# Patient Record
Sex: FEMALE | Race: WHITE | Employment: OTHER | URBAN - METROPOLITAN AREA
[De-identification: names, ages, dates, MRNs, and addresses within clinical notes are randomized per-mention and may not be internally consistent; named-entity substitution may affect disease eponyms.]

---

## 2018-10-01 RX ORDER — HYDROCHLOROTHIAZIDE 25 MG/1
TABLET ORAL
Qty: 90 TAB | Refills: 1 | Status: SHIPPED | OUTPATIENT
Start: 2018-10-01 | End: 2019-04-22 | Stop reason: SDUPTHER

## 2018-12-12 ENCOUNTER — OFFICE VISIT (OUTPATIENT)
Dept: FAMILY MEDICINE CLINIC | Age: 83
End: 2018-12-12

## 2018-12-12 VITALS
OXYGEN SATURATION: 99 % | WEIGHT: 128 LBS | SYSTOLIC BLOOD PRESSURE: 160 MMHG | DIASTOLIC BLOOD PRESSURE: 88 MMHG | RESPIRATION RATE: 16 BRPM | TEMPERATURE: 99.2 F | HEART RATE: 83 BPM | BODY MASS INDEX: 24.17 KG/M2 | HEIGHT: 61 IN

## 2018-12-12 DIAGNOSIS — M79.642 PAIN OF LEFT HAND: Primary | ICD-10-CM

## 2018-12-12 RX ORDER — IBUPROFEN 200 MG
TABLET ORAL
COMMUNITY
End: 2019-03-19

## 2018-12-12 RX ORDER — DICLOFENAC SODIUM 10 MG/G
GEL TOPICAL 4 TIMES DAILY
Qty: 100 G | Refills: 1 | Status: SHIPPED | OUTPATIENT
Start: 2018-12-12 | End: 2019-01-24

## 2018-12-12 NOTE — PROGRESS NOTES
1. Have you been to the ER, urgent care clinic since your last visit? Hospitalized since your last visit? No    2. Have you seen or consulted any other health care providers outside of the [address] [place] [address] since your last visit? Include any pap smears or colon screening.  No

## 2018-12-12 NOTE — PATIENT INSTRUCTIONS
Get [facility] joint splint (google images)  Get xray of hand  Use cream on left hand

## 2018-12-12 NOTE — PROGRESS NOTES
Nicholas Bledsoe is a 82 y.o. female presenting for Hand Pain (left hand x1 month)      History of Present Illness:  Left hand pain   One month  Left handed  Points to web space between thumb and index finger and palm  No injury. Hard to write  Wakes her at night hurting  Painful to close hand around anything  Taking Motrin with short term relief    Review of Systems   Constitutional: Negative for chills and fever. Musculoskeletal: Positive for joint pain. Negative for falls and myalgias. Past Medical History:   Diagnosis Date    Cystic fibrosis ([facility] [address]     GERD (gastroesophageal reflux disease)      Past Surgical History:   Procedure Laterality Date    HX DILATION AND CURETTAGE      HX HYSTERECTOMY      HX TONSIL AND ADENOIDECTOMY       Family History   Problem Relation Age of Onset    Coronary Artery Disease Mother     Breast Cancer Sister      Social History     Socioeconomic History    Marital status:      Spouse name: Not on file    Number of children: Not on file    Years of education: Not on file    Highest education level: Not on file   Social Needs    Financial resource strain: Not on file    Food insecurity - worry: Not on file    Food insecurity - inability: Not on file   Muut needs - medical: Not on file   Muut needs - non-medical: Not on file   Occupational History    Not on file   Tobacco Use    Smoking status: Never Smoker    Smokeless tobacco: Never Used   Substance and Sexual Activity    Alcohol use: No     Frequency: Never    Drug use: No    Sexual activity: Not on file   Other Topics Concern    Not on file   Social History Narrative    Not on file         Current Outpatient Medications   Medication Sig Dispense Refill    dornase alpha (PULMOZYME) 1 mg/mL nebulizer solution Take 2.5 mg by inhalation daily.  vit A/C/E ac/ZnOx/cupric oxide (EYE VITAMIN AND MINERALS PO) Take  by mouth.       ibuprofen (MOTRIN) 200 mg tablet Take  by mouth.      diclofenac (VOLTAREN) 1 % gel Apply  to affected area four (4) times daily. 100 g 1    hydroCHLOROthiazide (HYDRODIURIL) 25 mg tablet TAKE 1 TABLET BY MOUTH EVERY DAY 90 Tab 1         Allergies   Allergen Reactions    Levothyroxine Other (comments)     Hypertension, rash    Shellfish Derived Rash    Tetanus Toxoid, Adsorbed Other (comments)       Vitals:    12/12/18 1516   BP: 160/88   Pulse: 83   Resp: 16   Temp: 99.2 °F (37.3 °C)   TempSrc: Oral   SpO2: 99%   Weight: 128 lb (58.1 kg)   Height: 5' 1\" (1.549 m)     Body mass index is 24.19 kg/m². Objective  General: Patient alert and oriented and in NAD  HEENT: PER/EOMI, no conjunctival pallor or scleral icterus. No thyromegaly or cervical lymphadenopathy  Left hand, FROM all joints, some enlargement of left CMC, mildly tender, Mild pain in wrist with flexion, Normal neurovasc exam  Neuro: AAOx3  Psych: Appropriate mood and affect      Assessment and Plan:    1. Pain of left hand  Wrist and thumb splint, diclofenac cream  - XR HAND LT MIN 3 V; Future    FU 3-4 weeks. Diagnosis and plan discussed with patient who verbillized understanding. Follow-up Disposition:  Return in about 1 month (around 1/12/2019).     [facility]

## 2018-12-14 ENCOUNTER — HOSPITAL ENCOUNTER (OUTPATIENT)
Dept: GENERAL RADIOLOGY | Age: 83
Discharge: HOME | End: 2018-12-14
Attending: FAMILY MEDICINE
Payer: MEDICARE

## 2018-12-14 DIAGNOSIS — M79.642 PAIN OF LEFT HAND: ICD-10-CM

## 2018-12-14 PROCEDURE — 73130 X-RAY EXAM OF HAND: CPT

## 2018-12-17 NOTE — PROGRESS NOTES
Spoke with patient and informed patient of her X-ray results per Dr. Waqar Haley confirms that she has arthritis of the thumb joint as we thought.  She should use splint and arthritis cream and see me if this isn't working for her. Patient verbalized an understanding.

## 2018-12-17 NOTE — PROGRESS NOTES
Call patient. Her xray confirms that she has arthritis of the thumb joint as we thought. She should use splint and arthritis cream and see me if this isn't working for her.   RH

## 2018-12-31 ENCOUNTER — TELEPHONE (OUTPATIENT)
Dept: FAMILY MEDICINE CLINIC | Age: 83
End: 2018-12-31

## 2019-01-02 ENCOUNTER — OFFICE VISIT (OUTPATIENT)
Dept: FAMILY MEDICINE CLINIC | Age: 84
End: 2019-01-02

## 2019-01-02 VITALS
SYSTOLIC BLOOD PRESSURE: 166 MMHG | HEART RATE: 91 BPM | HEIGHT: 61 IN | DIASTOLIC BLOOD PRESSURE: 77 MMHG | OXYGEN SATURATION: 97 % | TEMPERATURE: 99.7 F | BODY MASS INDEX: 24.17 KG/M2 | WEIGHT: 128 LBS | RESPIRATION RATE: 18 BRPM

## 2019-01-02 DIAGNOSIS — M19.049 HAND ARTHRITIS: ICD-10-CM

## 2019-01-02 DIAGNOSIS — J40 BRONCHITIS: Primary | ICD-10-CM

## 2019-01-02 RX ORDER — CIPROFLOXACIN 750 MG/1
750 TABLET, FILM COATED ORAL 2 TIMES DAILY
Qty: 28 TAB | Refills: 0 | Status: SHIPPED | OUTPATIENT
Start: 2019-01-02 | End: 2019-01-04 | Stop reason: SDUPTHER

## 2019-01-02 NOTE — PROGRESS NOTES
Hira Her is a 82 y.o. female presenting for Arthritis and Cough      History of Present Illness:  Hand pain  Cream didn't help. Wearing splint. Hurts a lot at night. Hurts up into forearm  No weakness or numbness  Hurts to reach back for seat belt or  frying nciole  Again denies injury. Hurt after using vacuum   Got splint helps during day and helps her pick things    Developed Cough  Sick past 3 weeks,   Congestion, cough, yellow thick sputum  Still on pulmozyme for CF. Some low grade fever. Took minocycline which she got over phone from [facility]. 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 No better with minocycline and only took a single dose. .  Had a reaction and aching all over after the first dose. Review of Systems   Constitutional: Positive for malaise/fatigue. HENT: Positive for congestion. Negative for sinus pain and sore throat. Respiratory: Positive for cough and sputum production. Negative for hemoptysis, shortness of breath and wheezing. Cardiovascular: Negative for chest pain. Musculoskeletal: Positive for joint pain and neck pain. Neurological: Positive for headaches.        Past Medical History:   Diagnosis Date    Chronic insomnia     Cystic fibrosis (HCC)     GERD (gastroesophageal reflux disease)     HTN (hypertension)     Osteoporosis      Past Surgical History:   Procedure Laterality Date    HX DILATION AND CURETTAGE      HX HYSTERECTOMY      HX TONSIL AND ADENOIDECTOMY       Family History   Problem Relation Age of Onset    Coronary Artery Disease Mother     Breast Cancer Sister      Social History     Socioeconomic History    Marital status:      Spouse name: Not on file    Number of children: Not on file    Years of education: Not on file    Highest education level: Not on file   Social Needs    Financial resource strain: Not on file    Food insecurity - worry: Not on file    Food insecurity - inability: Not on file    Transportation needs - medical: Not on file   [employer] needs - non-medical: Not on file   Occupational History    Not on file   Tobacco Use    Smoking status: Never Smoker    Smokeless tobacco: Never Used   Substance and Sexual Activity    Alcohol use: No     Frequency: Never    Drug use: No    Sexual activity: Not on file   Other Topics Concern    Not on file   Social History Narrative    Not on file         Current Outpatient Medications   Medication Sig Dispense Refill    acetaminophen (TYLENOL PO) Take  by mouth.  diphenhydramine HCl (SLEEP AID, DIPHENHYDRAMINE, PO) Take  by mouth.  ciprofloxacin HCl (CIPRO) 750 mg tablet Take 1 Tab by mouth two (2) times a day for 14 days. 28 Tab 0    dornase alpha (PULMOZYME) 1 mg/mL nebulizer solution Take 2.5 mg by inhalation daily.  vit A/C/E ac/ZnOx/cupric oxide (EYE VITAMIN AND MINERALS PO) Take  by mouth.  ibuprofen (MOTRIN) 200 mg tablet Take  by mouth.  diclofenac (VOLTAREN) 1 % gel Apply  to affected area four (4) times daily. 100 g 1    hydroCHLOROthiazide (HYDRODIURIL) 25 mg tablet TAKE 1 TABLET BY MOUTH EVERY DAY 90 Tab 1         Allergies   Allergen Reactions    Levothyroxine Other (comments)     Hypertension, rash    Shellfish Derived Rash    Tetanus Toxoid, Adsorbed Other (comments)       Vitals:    01/02/19 1619   BP: 166/77   Pulse: 91   Resp: 18   Temp: 99.7 °F (37.6 °C)   TempSrc: Oral   SpO2: 97%   Weight: 128 lb (58.1 kg)   Height: 5' 1\" (1.549 m)     Body mass index is 24.19 kg/m². Objective  General: Patient alert and oriented and in NAD  HEENT: PER/EOMI, no conjunctival pallor or scleral icterus. No thyromegaly or cervical lymphadenopathy  Heart: Regular rate and rhythm, No murmurs, rubs or gallops.    Lungs: Clear to auscultation bilaterally, no wheezing, rales but rare ronchi left mid lung field  Ext: No edema, FROM neck without weakness anywhere in upper extremities  Left hand remains tender in [address] thumb area and has pain on wrist flexion in same area. Previous xrays ok except DJD CMC thumb and osteopenia. Skin: No rashes or lesions noted on exposed skin  Neuro: AAOx3  Psych: Appropriate mood and affect      Assessment and Plan:    1. Bronchitis  3 weeks of lung sx in CF patient. Unable to tolerate minocycline  Likely needs coverage of pseudomonas. Have given rx for CIPRO but patient not to fill until she hears from me. Will call [facility] in [place] and see if they agree or have other recommendations. - ciprofloxacin HCl (CIPRO) 750 mg tablet; Take 1 Tab by mouth two (2) times a day for 14 days. Dispense: 28 Tab; Refill: 0    2. 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 Aches all over with lung infection and inflammation but no evidence of inflammatory arthritis that I see today.  - REFERRAL TO ORTHOPEDICS          Diagnosis and plan discussed with patient who verbillized understanding.     Follow-up Disposition: Not on File    [facility]

## 2019-01-02 NOTE — PROGRESS NOTES
1. Have you been to the ER, urgent care clinic since your last visit? Hospitalized since your last visit? No    2. Have you seen or consulted any other health care providers outside of the [address] [place] [address] since your last visit? Include any pap smears or colon screening.  No

## 2019-01-03 ENCOUNTER — TELEPHONE (OUTPATIENT)
Dept: FAMILY MEDICINE CLINIC | Age: 84
End: 2019-01-03

## 2019-01-03 NOTE — TELEPHONE ENCOUNTER
Called and discussed care with Dr. Nikos Vaughn [phone]. He agrees with CIPRO. Patient didn't tolerate high dose Bactrim in past.    He would like her to FU with him in 2 weeks to repeat cultures, CXR and PFT's    Left message RE the above for patient.

## 2019-01-04 ENCOUNTER — TELEPHONE (OUTPATIENT)
Dept: FAMILY MEDICINE CLINIC | Age: 84
End: 2019-01-04

## 2019-01-04 DIAGNOSIS — J40 BRONCHITIS: ICD-10-CM

## 2019-01-04 RX ORDER — CIPROFLOXACIN 750 MG/1
750 TABLET, FILM COATED ORAL 2 TIMES DAILY
Qty: 28 TAB | Refills: 0 | Status: SHIPPED | OUTPATIENT
Start: 2019-01-04 | End: 2019-01-18

## 2019-01-04 NOTE — TELEPHONE ENCOUNTER
Patient called and left a voicemail stating that she verbalizes an understanding of the following message Dr. Lizette Will left on yesterday:   \"Called and discussed care with Dr. Heladio Laura [phone].  [address] agrees with CIPRO. Patient didn't tolerate high dose Bactrim in past.  [address] would like her to FU with him in 2 weeks to repeat cultures, CXR and PFT's. \"    Patient states that she will follow up with Dr. Rosy Pérez.

## 2019-01-04 NOTE — TELEPHONE ENCOUNTER
Patient didn't get med. We sent incorrectly to mail order service. Resent to local [facility].  Didn't get message either (on cell).   Reviewed instructions to FU at [facility] in 2 weeks with CF clinic for CXR, etc.

## 2019-01-17 ENCOUNTER — TELEPHONE (OUTPATIENT)
Dept: FAMILY MEDICINE CLINIC | Age: 84
End: 2019-01-17

## 2019-01-17 NOTE — TELEPHONE ENCOUNTER
Received a voicemail from patient stating that she gave Dr. Magen Loaiza office at [facility] a call and they stated that our office needed to call for patient to set up an appointment and even then it will be 6-8 weeks before patient can be seen. In voicemail patient stated if she can see anyone else and per patient she was told no. Patient states that she can not stand the pain that she is having and it is keeping her up at night. Please advise.

## 2019-01-18 NOTE — TELEPHONE ENCOUNTER
See previous two clinic notes:    Patient with severe arthralgias after single dose of minocycline for persistent bronchitis symptoms. Patient has CF and chronic bronchiectasis followed at [facility]. Initially sx concentrated in wrist and hand. Seen by hand surgery and temporarily better after steroid shot, that benefit dissipated after 3-4 days. Have arranged to be seen for RHEUM for possible reactive arthritis.   RH

## 2019-01-24 ENCOUNTER — HOSPITAL ENCOUNTER (OUTPATIENT)
Dept: LAB | Age: 84
Discharge: HOME | End: 2019-01-24
Payer: MEDICARE

## 2019-01-24 ENCOUNTER — OFFICE VISIT (OUTPATIENT)
Dept: RHEUMATOLOGY | Age: 84
End: 2019-01-24

## 2019-01-24 VITALS
TEMPERATURE: 99.1 F | RESPIRATION RATE: 20 BRPM | HEIGHT: 61 IN | WEIGHT: 127.6 LBS | OXYGEN SATURATION: 93 % | HEART RATE: 97 BPM | DIASTOLIC BLOOD PRESSURE: 77 MMHG | SYSTOLIC BLOOD PRESSURE: 154 MMHG | BODY MASS INDEX: 24.09 KG/M2

## 2019-01-24 DIAGNOSIS — M19.049 CMC ARTHRITIS: ICD-10-CM

## 2019-01-24 DIAGNOSIS — Z14.1 CYSTIC FIBROSIS CARRIER: ICD-10-CM

## 2019-01-24 DIAGNOSIS — C53.9 MALIGNANT NEOPLASM OF CERVIX, UNSPECIFIED SITE (HCC): ICD-10-CM

## 2019-01-24 DIAGNOSIS — M35.3 POLYMYALGIA RHEUMATICA (HCC): Primary | ICD-10-CM

## 2019-01-24 PROCEDURE — 85025 COMPLETE CBC W/AUTO DIFF WBC: CPT

## 2019-01-24 PROCEDURE — 86140 C-REACTIVE PROTEIN: CPT

## 2019-01-24 PROCEDURE — 85651 RBC SED RATE NONAUTOMATED: CPT

## 2019-01-24 PROCEDURE — 86431 RHEUMATOID FACTOR QUANT: CPT

## 2019-01-24 PROCEDURE — 86200 CCP ANTIBODY: CPT

## 2019-01-24 PROCEDURE — 80053 COMPREHEN METABOLIC PANEL: CPT

## 2019-01-24 PROCEDURE — 36415 COLL VENOUS BLD VENIPUNCTURE: CPT

## 2019-01-24 RX ORDER — PREDNISONE 5 MG/1
TABLET ORAL
Qty: 90 TAB | Refills: 1 | Status: SHIPPED | OUTPATIENT
Start: 2019-01-24 | End: 2019-04-03 | Stop reason: SDUPTHER

## 2019-01-24 RX ORDER — LANOLIN ALCOHOL/MO/W.PET/CERES
1000 CREAM (GRAM) TOPICAL DAILY
COMMUNITY
End: 2019-02-19

## 2019-01-24 RX ORDER — PREDNISONE 10 MG/1
TABLET ORAL
Qty: 60 TAB | Refills: 1 | Status: SHIPPED | OUTPATIENT
Start: 2019-01-24 | End: 2019-02-19

## 2019-01-24 NOTE — PATIENT INSTRUCTIONS
Prednisone Instructions: Take 20 mg oral daily for 2 eweks, then Take 17.5 mg oral daily for 2 weeks, then take 15 mg oral daily for 2 weeks, then take 12.5 mg oral daily for 2 weeks, then take 10 mg oral daily for 2 weeks, then 7.5 mg daily for 2 weeks,  Then 5 mg daily for 2 weeks, then 2.5 mg daily for 2 weeks, then stop

## 2019-01-24 NOTE — PROGRESS NOTES
REASON FOR VISIT This is the initial evaluation for Ms. Karla Metcalf a 82 y.o.  female for question of arthritis. The patient is referred to the [facility] at the request of Dr. Frank Vargas. HISTORY OF PRESENT ILLNESS This is a 82 y.o. female with hx of cervical cancer s/p hysterectomy, neuropathy in feet. Today, the patient complains of pain all over. MHAQ: 1.5 Pain scale: 8.5/10 Patient is s/p left CMC joint injection for OA. The patient notes that she has had pains occasionally which go away with motrin. Around thanksgiving she was doing a lot of cleaning and her left hand started hurting her. The pain kept getting worse. She went to her PCP and she was given a topical cream to put on it. The gel made her get hives. At the same time,  Her  had the flu and she got the same symptoms. She has CF and normally she has clear sputum but during this period her sputum was yellow-green. She called her CF doctor and she was put in minocycline. She took it on 12/24/2018. Her hand continued hurting. After taking the antibiotic, she started getting pain everywhere including arms, neck, back, legs. She stopped the antibiotic thinking maybe it was the antibiotic but the pain did not go away after a few days. She is very active and now she just moves and she has the pain. At night time, she couldn't sleep due to the pain. She was taking motrin. At night even moving a little bit made her have bad pain. Even now she is the same and has to take motrin multiple times at night. It is an achy pain. When she wakes up now, she has trouble even sitting up. She feels worst the first hour when she is up. She moves around better after an hour but the symptoms are still there. She is not on prednisone for this. She also has stiffness which is there during the day.   At night time, it is pain that bothers her the most.   
 She feels her legs and feet are swollen and has lymphedema. Her cancer is now in remission. She gets monitoring frequently every 6 months. No weakness. Just loss of energy and pain. REVIEW OF SYSTEMS A 15 point review of systems was performed and summarized below. The questionnaire was reviewed with the patient and scanned into the patient's medical record. General:  denies recent weight gain, recent weight loss, fatigue, weakness, fever, night sweats Musculoskeletal: endorses morning stiffness (lasting 120 minutes) joint pain, denies  joint swelling, , muscle pain Ears: denies ringing in ears, loss of hearing, deafness Eyes: denies pain, redness, loss of vision, double vision, blurred vision, dryness, foreign body sensation Mouth:  denies sore tongue, oral ulcers, bleeding gums, loss of taste, dryness, increased dental caries Nose:  denies nosebleeds, loss of smell, nasal ulcers Throat:  denies frequent sore throats, hoarseness, difficulty in swallowing, pain in jaw while chewing Neck:  denies swollen glands, tender glands Cardiopulmonary: endorses productive coughdenies pain in chest, irregular heart beat, sudden changes in heart beat, shortness of breath, difficulty breathing at night, dry cough, , coughing of blood, wheezing Gastrointestinal:  denies nausea, heartburn, stomach pain relieved by food, vomiting of blood/\"coffee grounds\", jaundice, increasing constipation, persistent diarrhea, blood in stools, black stools Genitourinary:  denies nocturia, difficult urination, pain or burning on urination, blood in urine, cloudy urine, pus in urine, genital discharge, frequent urination, vaginal dryness, rash/ulcers, sexual difficulties Hematologic: denies anemia, bleeding tendency, blood clots Skin:  denies easy bruising, sun sensitive, rash, redness, hives, skin tightness, nodules/bumps, hair loss, color changes of hands or feet in the cold (Raynaud's), nailbed changes, mechanics hands Neurologic: endorses headachesdenies , dizziness, muscle weakness, numbness or tingling in hands/feet, memory loss Psychiatric: denies depression, excessive worries, PTSD, Bipolar Sleep: endorses poor sleep (5-6 hours), difficulty falling asleep, difficulty staying asleep  
denies  denies snoring, apnea, daytime somnolence, PAST MEDICAL HISTORY Past Medical History:  
Diagnosis Date  Chronic insomnia  Cystic fibrosis ([facility] [address] Dr. Maurilio Harris, [facility] CF clinic, Brochiectasis, pancreatic cysts in past  
 GERD (gastroesophageal reflux disease)  HTN (hypertension)  Osteoporosis Past Surgical History:  
Procedure Laterality Date  HX DILATION AND CURETTAGE    
 HX HYSTERECTOMY  HX TONSIL AND ADENOIDECTOMY FAMILY HISTORY Family History Problem Relation Age of Onset  Coronary Artery Disease Mother  Breast Cancer Sister  Cystic Fibrosis Son SOCIAL HISTORY Social History Tobacco Use  Smoking status: Never Smoker  Smokeless tobacco: Never Used Substance Use Topics  Alcohol use: No  
  Frequency: Never  Drug use: No  
 
 
MEDICATIONS Current Outpatient Medications Medication Sig Dispense Refill  cyanocobalamin 1,000 mcg tablet Take 1,000 mcg by mouth daily.  predniSONE (DELTASONE) 10 mg tablet Take 20 mg for 2 weeks, then 17.5 mg for 2 weeks, then 15 mg for 2 weeks, then 12.5 mg for 2 weeks 60 Tab 1  predniSONE (DELTASONE) 5 mg tablet Take 20 mg for 2 weeks, then 17.5 mg for 2 weeks, then 15 mg for 2 weeks, then 12.5 mg for 2 weeks 90 Tab 1  
 acetaminophen (TYLENOL PO) Take  by mouth.  diphenhydramine HCl (SLEEP AID, DIPHENHYDRAMINE, PO) Take  by mouth.  dornase alpha (PULMOZYME) 1 mg/mL nebulizer solution Take 2.5 mg by inhalation daily.  vit A/C/E ac/ZnOx/cupric oxide (EYE VITAMIN AND MINERALS PO) Take  by mouth.  ibuprofen (MOTRIN) 200 mg tablet Take  by mouth.  hydroCHLOROthiazide (HYDRODIURIL) 25 mg tablet TAKE 1 TABLET BY MOUTH EVERY DAY 90 Tab 1 ALLERGIES Allergies Allergen Reactions  Levothyroxine Other (comments) Hypertension, rash  Shellfish Derived Rash  Tetanus Toxoid, Adsorbed Other (comments) PHYSICAL EXAMINATION Visit Vitals /77 (BP 1 Location: Right arm, BP Patient Position: Sitting) Pulse 97 Temp 99.1 °F (37.3 °C) (Oral) Resp 20 Ht 5' 1\" (1.549 m) Wt 127 lb 9.6 oz (57.9 kg) SpO2 93% BMI 24.11 kg/m² Body mass index is 24.11 kg/m². General: NAD HEENT: PERRL, anicteric, non-injected sclerae; oropharynx without ulcers, erythema, or exudate. Moist mucous membranes. Lymphatic: No cervical or axillary lymphadenopathy. Cardiovascular: S1, S2,no R/M/G Pulmonary: CTA b/l. No wheezes/rales/rhonchi. Abdominal: Soft,NTND, + BS. Skin: No rash, nodules, or periungual changes. + rosacea Neuro: Alert; able to carry normal conversation Musculoskeletal:  
TTP of lower lumbar spine mild TTP of the upper neck muscles, AC, GH and subacromial bursa b/l FROM of b/l shoulders but with pain. Empty can positive, Impingement positive b/l. Subscapularis negative,  Infraspinatus negative B/l CMC squaring and TTP Right knee with TTP and a small cool effusion TTP of b/l trochanteric bursa DATA REVIEW Prior medical records were reviewed and if applicable are summarized as below: 
 
Labs: N/A Imaging:  
Left hand xray (12/18): [facility] arthritis ASSESSMENT AND PLAN 
 
A 82 y.o. female with history of cervical cancer status post hysterectomy, cystic fibrosis carrier, presents today for evaluation of arthralgias specifically in shoulders, neck as well as hips and legs. The patient's symptoms are concerning for polymyalgia rheumatica to me. Differential diagnosis also includes rheumatoid arthritis, CPPD arthropathy.   It is less likely rotator cuff tendinitis given that she has pain in her legs and hips as well. #Arthralgias: As noted above I am concerned about PMR. -Bilateral shoulder x-rays to evaluate for CPPD. 
-RF, CCP, ESR, CRP 
-Start patient on prednisone 20 mg oral daily for 2 weeks. Taper by 2.5 mg every 2 weeks as tolerated. Advised patient to inform me next time about the response to prednisone. -  I counseled the patient on the risk of avascular necrosis from corticosteroids. For each 20 mg of prednisone taken for over a month, the risk of AVN is 5%. It can also lead to weight gain, mood imbalances, osteoporosis, acne etc.  
-Up-to-date with malignancy screening. 
-No symptoms for GCA. #Cervical cancer: Appears to be in remission #Cystic fibrosis: Patient will speak with her doctor before initiating prednisone. #Medication toxicity monitoring: 
-CBC, CMP The patient voiced understanding of the aforementioned assessment and plan. Summary of plan was provided in the After Visit Summary patient instructions. I also provided education about MyChart setup and utility. Ms. Agueda Gamble has a reminder for a \"due or due soon\" health maintenance. I have asked that she contact her primary care provider for follow-up on this health maintenance. TODAY'S ORDERS Orders Placed This Encounter  XR SHOULDER RT AP/LAT MIN 2 V  
 XR SHOULDER LT AP/LAT MIN 2 V  
 METABOLIC PANEL, COMPREHENSIVE  
 CBC WITH AUTOMATED DIFF  
 SED RATE (ESR)  C REACTIVE PROTEIN, QT  
 CYCLIC CITRUL PEPTIDE AB, IGG  
 RHEUMATOID FACTOR, QL  
 cyanocobalamin 1,000 mcg tablet  predniSONE (DELTASONE) 10 mg tablet  predniSONE (DELTASONE) 5 mg tablet Future Appointments Date Time Provider Toma Newman 2/21/2019  8:30 AM Lisa Whipple MD [facility] Camilla Wiley MD 
 
Adult Rheumatology [facility], [address] [place] [address] Phone [phone] Fax [phone]

## 2019-01-25 LAB
ALBUMIN SERPL-MCNC: 4.2 G/DL (ref 3.5–4.7)
ALBUMIN/GLOB SERPL: 1.4 {RATIO} (ref 1.2–2.2)
ALP SERPL-CCNC: 80 IU/L (ref 39–117)
ALT SERPL-CCNC: 10 IU/L (ref 0–32)
AST SERPL-CCNC: 13 IU/L (ref 0–40)
BASOPHILS # BLD AUTO: 0 X10E3/UL (ref 0–0.2)
BASOPHILS NFR BLD AUTO: 0 %
BILIRUB SERPL-MCNC: 0.5 MG/DL (ref 0–1.2)
BUN SERPL-MCNC: 17 MG/DL (ref 8–27)
BUN/CREAT SERPL: 29 (ref 12–28)
CALCIUM SERPL-MCNC: 9.8 MG/DL (ref 8.7–10.3)
CCP IGA+IGG SERPL IA-ACNC: 6 UNITS (ref 0–19)
CHLORIDE SERPL-SCNC: 102 MMOL/L (ref 96–106)
CO2 SERPL-SCNC: 26 MMOL/L (ref 20–29)
CREAT SERPL-MCNC: 0.58 MG/DL (ref 0.57–1)
CRP SERPL-MCNC: 30.8 MG/L (ref 0–4.9)
EOSINOPHIL # BLD AUTO: 0.2 X10E3/UL (ref 0–0.4)
EOSINOPHIL NFR BLD AUTO: 2 %
ERYTHROCYTE [DISTWIDTH] IN BLOOD BY AUTOMATED COUNT: 13.1 % (ref 12.3–15.4)
ERYTHROCYTE [SEDIMENTATION RATE] IN BLOOD BY WESTERGREN METHOD: 64 MM/HR (ref 0–40)
GLOBULIN SER CALC-MCNC: 3.1 G/DL (ref 1.5–4.5)
GLUCOSE SERPL-MCNC: 112 MG/DL (ref 65–99)
HCT VFR BLD AUTO: 37.2 % (ref 34–46.6)
HGB BLD-MCNC: 12.5 G/DL (ref 11.1–15.9)
IMM GRANULOCYTES # BLD AUTO: 0 X10E3/UL (ref 0–0.1)
IMM GRANULOCYTES NFR BLD AUTO: 0 %
LYMPHOCYTES # BLD AUTO: 2 X10E3/UL (ref 0.7–3.1)
LYMPHOCYTES NFR BLD AUTO: 20 %
MCH RBC QN AUTO: 30.3 PG (ref 26.6–33)
MCHC RBC AUTO-ENTMCNC: 33.6 G/DL (ref 31.5–35.7)
MCV RBC AUTO: 90 FL (ref 79–97)
MONOCYTES # BLD AUTO: 0.6 X10E3/UL (ref 0.1–0.9)
MONOCYTES NFR BLD AUTO: 6 %
NEUTROPHILS # BLD AUTO: 7.3 X10E3/UL (ref 1.4–7)
NEUTROPHILS NFR BLD AUTO: 72 %
PLATELET # BLD AUTO: 465 X10E3/UL (ref 150–379)
POTASSIUM SERPL-SCNC: 4.1 MMOL/L (ref 3.5–5.2)
PROT SERPL-MCNC: 7.3 G/DL (ref 6–8.5)
RBC # BLD AUTO: 4.13 X10E6/UL (ref 3.77–5.28)
RHEUMATOID FACT SERPL-ACNC: 11.6 IU/ML (ref 0–13.9)
SODIUM SERPL-SCNC: 145 MMOL/L (ref 134–144)
WBC # BLD AUTO: 10 X10E3/UL (ref 3.4–10.8)

## 2019-01-29 ENCOUNTER — TELEPHONE (OUTPATIENT)
Dept: RHEUMATOLOGY | Age: 84
End: 2019-01-29

## 2019-01-29 ENCOUNTER — TELEPHONE (OUTPATIENT)
Dept: FAMILY MEDICINE CLINIC | Age: 84
End: 2019-01-29

## 2019-01-29 DIAGNOSIS — M18.12 OSTEOARTHRITIS OF CARPOMETACARPAL JOINT OF LEFT THUMB, UNSPECIFIED OSTEOARTHRITIS TYPE: ICD-10-CM

## 2019-01-29 DIAGNOSIS — M35.3 PMR (POLYMYALGIA RHEUMATICA) (HCC): Primary | ICD-10-CM

## 2019-01-29 NOTE — TELEPHONE ENCOUNTER
Patient called and stated that she would like a referral for PT due to the extreme amount of pain that she is having. Stated that after seeing Dr. Alysia Gonzalez it was suggested she take Prednisone but her CF doctor strongly discourages. Patient states she will not be able to see Dr. Alysia Gonzalez for two weeks and doesn't thinks she can manage that long with her pain.

## 2019-01-29 NOTE — TELEPHONE ENCOUNTER
----- Message from Aris Marquez LPN sent at 3/42/7426  9:41 AM EST -----  Regarding: FW: Dr. Jone Mcgraw      ----- Message -----  From: Breanna Ford  Sent: 1/29/2019   9:10 AM  To: [facility] Nurse Pool  Subject: Dr. Jone Mcgraw                            Pt is requesting a callback from Dr. Gwen Gilbert in reference to medication & lab/xray results. Dr. Imelda Santiago SPARKLE.[phone] advised pt to stop taking \"Prednisone\" due to having lung bacteria infection. Wants to know if physical therapy is possible instead for pain. Pt also needs results from lab & xray tests. In more than before; mobility is getting worse. Pt best contact [phone]; leave detailed vm if not answered.

## 2019-01-29 NOTE — TELEPHONE ENCOUNTER
Call and ask if she has a particular PT in mind. I will send in referral if she lets me know where or I can suggest a place.   RH

## 2019-01-29 NOTE — TELEPHONE ENCOUNTER
Spoke with patient. Pt advised Dr. Noemy Irving is out of the office for the next 2 week,  call PCP for orders for PT.  Earlier appt scheduled for patient to see Dr. Noemy Irving

## 2019-01-29 NOTE — TELEPHONE ENCOUNTER
Patient does not have a particular PT in mind. She states that she is off [address] and would like something close by.

## 2019-01-31 NOTE — TELEPHONE ENCOUNTER
Yes, I think she needs a call with Dr. Yanira Ventura since patient's lung doctor vetoed the idea of Mrs. Karla Metcalf taking steroids. She needs to contact Dr. Yanira Ventura to discuss other options or have him talk to lung doctor.   RH

## 2019-01-31 NOTE — TELEPHONE ENCOUNTER
Called patient and left voicemail per Dr. Nataly Rizo   she needs a call with Dr. Sebastián Rae since patient's lung doctor vetoed the idea of Mrs. Sheba Cabrera taking steroids. She needs to contact Dr. Sebastián Rae to discuss other options or have him talk to lung doctor. Informed patient to call back if she has any questions.

## 2019-01-31 NOTE — TELEPHONE ENCOUNTER
Referral faxed to Dr. Leti Lanza. Spoke with patient to inform her of name and location of PT. Patient was also inquiring if you could give her a call and discuss her labs ordered by Dr. Brinda Snowden and your opinion. Also suggested patient call Dr. Parry Ards office to have them explain lab results as well.

## 2019-02-06 ENCOUNTER — HOSPITAL ENCOUNTER (OUTPATIENT)
Dept: PHYSICAL THERAPY | Age: 84
Discharge: HOME | End: 2019-02-06
Payer: MEDICARE

## 2019-02-06 PROCEDURE — 97163 PT EVAL HIGH COMPLEX 45 MIN: CPT | Performed by: PHYSICAL THERAPIST

## 2019-02-06 PROCEDURE — 97018 PARAFFIN BATH THERAPY: CPT | Performed by: PHYSICAL THERAPIST

## 2019-02-06 PROCEDURE — 97014 ELECTRIC STIMULATION THERAPY: CPT | Performed by: PHYSICAL THERAPIST

## 2019-02-06 NOTE — PROGRESS NOTES
PT INITIAL EVALUATION NOTE - [facility] 2-15    Patient Name: Kobe Yoder  Date:2019  : 1935  [x]  Patient  Verified  Payor: Lexie  / Plan: [place] MEDICARE PART A & B / Product Type: Medicare /    In time:11:00 am  Out time:12:00 pm  Total Treatment Time (min): 60  Total Timed Codes (min): 0  1:1 Treatment Time ( [address] only): 1   Visit #: 1     Treatment Area: Left hand pain [M79.642]    SUBJECTIVE  Pain Level (0-10 scale): 6/10, 9/10 at worst  Any medication changes, allergies to medications, adverse drug reactions, diagnosis change, or new procedure performed?: [] No    [x] Yes (see summary sheet for update)  Subjective:    Pt reports onset of L hand pain 2018 and was started on a topical cortisone that did not help. She then got ill and was prescribed Minocycline and after the first dosage there was onset of pain in her shoulders, arms, back, legs. She reports that her pain is getting worse and she is having difficulty with normal mobility. She cannot stay asleep at night because she has so much pain. She reports that she had lymphedema bilateral LE that has 'flared' up recently. She reports that she has recently been trying to wear her LE compression stockings. She has a lot of blood testing that is not normal at this time. She reports that her doctors do not know what has caused her current condition. She is an excellent historian. 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[employer]; was a CPA  Living Situation: 3 story home with   Pt Goals: 'get back to my normal self'; pt is not able to move or do things around her home like she has previously been able to  Barriers: unknown nature of current joint pain and infection  Motivation: excellent  Substance use: none stated  FABQ Score: see in FOTO   Cognition: A & O x 4        *per labs in [address] [facility] the following are present: elevated glucose, elevated sodium, elevated BUN/creatnine, elevated C-reactive protein, elevated sed rate, elevated neutrophils, elevated platelets*     OBJECTIVE/EXAMINATION  Posture:  Min fwd head position  Other Observations:  L hand swelling present; facial flushing present; R LE from knee to foot with edema present; transfers slow with facial grimacing present secondary to pain  Gait and Functional Mobility:  Slow ambulation with limited UE swing and limited heel to toe with ambulation  Palpation: skin is sensitive L wrist and L hand to light touch        Lumbar AROM:  Cervical AROM:        R  L  R  L  Flexion    Not tested   All cervical AROM limited secondary to pain    Extension           Side Bending           Rotation               LOWER QUARTER   MUSCLE STRENGTH  KEY       R  L  0 - No Contraction  L1, L2 Psoas  3+  3+  1 - Trace   L3 Quads  4-  4-  2 - Poor   L4 Tib Ant  4  4  3 - Fair    L5 EHL  4  4  4 - Good   S1 FHL  4  4  5 - Normal   S2 Hams  4-  4-  *all MMT were limited by pn with max effort given by pt    UPPER QUARTER   MUSCLE STRENGTH  KEY       R  L  0 - No Contraction  C1, C2 Neck Flex     1 - Trace   C3 Side Flex      2 - Poor   C4 Sh Elev      3 - Fair    C5 Deltoid/Biceps 4-  4-  4 - Good   C6 Wrist Ext  3+  3+  5 - Normal   C7 Triceps  4-  4-      C8 Thumb Ext  4-  4-      T1 Hand Inst  3+  3+  *all MMT were limited by pn with max effort given by pt            MMT:  on R 19.9 lb, L 14.4 lb  Neurological: Reflexes / Sensations: L dorsal hand and wrist skin is sensitive to touch      Modality rationale: decrease pain to improve the patients ability to perform self care activities   Type Additional Details   [x] Estim: []Att   [x]Unatt []TENS instruct                  []IFC  [x]Premod   []NMES                     []Other:  []w/US   []w/ice   []w/heat  Position: seated  Location: bilateral UT and brachium x 15 min with N tissue alana   []  Traction: [] Cervical       []Lumbar                       [] Prone          []Supine                       []Intermittent   []Continuous Lbs:  [] before manual  [] after manual  []w/heat   []  Ultrasound: []Continuous   [] Pulsed at:                           []1MHz   []3MHz Location:  W/cm2:   [x] Paraffin         Location:  Bilateral hands x 8 dips  []w/heat   []  Ice     []  Heat  []  Ice massage Position:  Location:   []  Laser  []  Other: Position:  Location:   []  Vasopneumatic Device     [x] Skin assessment post-treatment:  [x]intact [x]redness- no adverse reaction    []redness - adverse reaction:           With   [] TE   [] TA   [] neuro   [] other: Patient Education: [x] Review HEP    [] Progressed/Changed HEP based on:   [] positioning   [] body mechanics   [] transfers   [] heat/ice application    [] other:      Other Objective/Functional Measures: lymphedema measurements to be gathered on subsequent visits    Pain Level (0-10 scale) post treatment: 'a little better'    ASSESSMENT/Changes in Function:     [x]  See Plan of Care      Garrett Garza PT, DPT, [facility] 2/6/2019

## 2019-02-06 NOTE — PROGRESS NOTES
[address] [facility], [address]  Phone: [phone]  Fax: [phone]    Plan of Care/Statement of Necessity for Physical Therapy Services  2-15    Patient name: Luiz Rosa  : 1935  Provider#: [id number]  Referral source: Andrew Lyons MD      Medical/Treatment Diagnosis: Left hand pain [M79.642]     Prior Hospitalization: see medical history     Comorbidities: see in paperlite chart and in [facility]    Prior Level of Function: independent with all ADL, driving, self care and care of 3 story home that she lives in with her   Medications: Verified on Patient Summary List  Start of Care: 2019      Onset Date: 2019   The Plan of Care and following information is based on the information from the initial evaluation. Assessment/ key information: Brooklyn Arredondo has the presence of signs of inflammation, exacerbation of R LE lymphedema, pain in multiple joints to active and passive motion, a reduction in ADL and IADL abilities and inability to sleep at night secondary to pain. She will benefit from skilled PT prior to D/C for pain reduction and intervention/strategies to increase her functional mobility. Evaluation Complexity History HIGH Complexity :3+ comorbidities / personal factors will impact the outcome/ POC ; Examination HIGH Complexity : 4+ Standardized tests and measures addressing body structure, function, activity limitation and / or participation in recreation  ;Presentation HIGH Complexity : Unstable and unpredictable characteristics  ; Clinical Decision Making HIGH Complexity : FOTO score of 1- 25   Overall Complexity Rating: HIGH     Problem List: pain affecting function, decrease ROM, decrease strength, edema affecting function, impaired gait/ balance, decrease ADL/ functional abilitiies, decrease activity tolerance, decrease flexibility/ joint mobility and decrease transfer abilities   Treatment Plan may include any combination of the following: Therapeutic exercise, Therapeutic activities, Neuromuscular re-education, Physical agent/modality, Gait/balance training, Manual therapy, Patient education, Self Care training, Functional mobility training, Home safety training and Stair training  Patient / Family readiness to learn indicated by: asking questions, trying to perform skills and interest  Persons(s) to be included in education: patient (P)  Barriers to Learning/Limitations: None  Patient Goal (s): to be able to do what i did about 3 months ago. i didn't use to be like this  Patient Self Reported Health Status: good  Rehabilitation Potential: good    Short Term Goals: To be accomplished in 4 weeks:  Pt will demo sit to stand transfer with no UE assistance and <2/10 pn  Pt will demo independence with HEP wtih no v.c. Pt will verbalize and demonstrate home maintenance of lymphedema strategies to allow for improved mobility    Long Term Goals: To be accomplished in 12 weeks:  Pt will demo low fall risk on appropriate balance scale at the time of D/C  Pt will demo premorbid mobility and ADL abilties at the time of D/C  Pt will demo the ability to perform cervical and shoulder AROM to allow for safe driving independently    Frequency / Duration: Patient to be seen 2 times per week for up to 12 weeks. Patient/ Caregiver education and instruction: self care, activity modification, brace/ splint application and exercises    [x]  Plan of care has been reviewed with PTA        Certification Period: 2/6/2019 - 5/1/2019    Boris Castro PT, DPT, [facility] 2/6/2019     ________________________________________________________________________    I certify that the above Therapy Services are being furnished while the patient is under my care. I agree with the treatment plan and certify that this therapy is necessary.     [de-identified] Signature:____________________  Date:____________Time: _________

## 2019-02-08 ENCOUNTER — HOSPITAL ENCOUNTER (OUTPATIENT)
Dept: PHYSICAL THERAPY | Age: 84
Discharge: HOME | End: 2019-02-08
Payer: MEDICARE

## 2019-02-08 PROCEDURE — 97110 THERAPEUTIC EXERCISES: CPT | Performed by: PHYSICAL THERAPIST

## 2019-02-08 PROCEDURE — 97014 ELECTRIC STIMULATION THERAPY: CPT | Performed by: PHYSICAL THERAPIST

## 2019-02-08 PROCEDURE — 97018 PARAFFIN BATH THERAPY: CPT | Performed by: PHYSICAL THERAPIST

## 2019-02-08 NOTE — PROGRESS NOTES
PT DAILY TREATMENT NOTE - [facility] 2-15    Patient Name: Evan Hinson  Date:2019  : 1935  [x]  Patient  Verified  Payor: Jacinto Hyman / Plan: [place] MEDICARE PART A & B / Product Type: Medicare /    In time:10:30 am  Out time:11:30 am  Total Treatment Time (min): 60  Total Timed Codes (min): 30  1:1 Treatment Time ( only): 30   Visit #: 2     Treatment Area: Left hand pain [M79.642]    SUBJECTIVE  Pain Level (0-10 scale): 5/10  Any medication changes, allergies to medications, adverse drug reactions, diagnosis change, or new procedure performed?: [x] No    [] Yes (see summary sheet for update)  Subjective functional status/changes:   [] No changes reported  Pt reports that she got about 1 hour of pain relief from the pariffin at the last visit. Overall she feels about the same and did not sleep well last night secondary to pain.     OBJECTIVE    Modality rationale: decrease inflammation and decrease pain to improve the patients ability to perform ADL skills   Type Additional Details   [x] Estim: []Att   [x]Unatt    []TENS instruct                  []IFC  [x]Premod   []NMES                     []Other:  []w/US   []w/ice   []w/heat  Position:seated  Location: bilateral brachial area   []  Traction: [] Cervical       []Lumbar                       [] Prone          []Supine                       []Intermittent   []Continuous Lbs:  [] before manual  [] after manual  []w/heat   []  Ultrasound: []Continuous   [] Pulsed                       at: []1MHz   []3MHz Location:  W/cm2:   [x] Paraffin         Location: bilateral hands  []w/heat   []  Ice     []  Heat  []  Ice massage Position:  Location:   []  Laser  []  Other: Position:  Location:   []  Vasopneumatic Device Pressure:       [] lo [] med [] hi   Temperature:      [x] Skin assessment post-treatment:  [x]intact [x]redness- no adverse reaction    []redness - adverse reaction:     30 min Therapeutic Exercise:  [x] See flow sheet :   Rationale: increase ROM and reduce pain to improve the patients ability to sit to stand with less pain present            With   [] TE   [] TA   [] neuro   [] other: Patient Education: [x] Review HEP    [] Progressed/Changed HEP based on:   [] positioning   [] body mechanics   [] transfers   [] heat/ice application    [] other:      Other Objective/Functional Measures:   R hand at  PIP row: 18 cm           L 19.2 cm    R wrist at level of distal radius: 14.9 cm        L: 15.8 cm    R LE:  21.4 cm around bunion and 5th ray proximal to PIP  25.7 cm at bilateral ankle malleolus  34.4 cm at 17 cm distal to inf pole of patella  40.1 cm across middle of patella  L LE: (same locations as above)  20.6 cm  23.0 cm  32.8 cm  38.4 cm      Pain Level (0-10 scale) post treatment: 'a little better'    ASSESSMENT/Changes in Function:   Pt has been compliant with donning her R thigh high compression garmet. Patient will continue to benefit from skilled PT services to modify and progress therapeutic interventions, address functional mobility deficits, address ROM deficits, address strength deficits, analyze and cue movement patterns, analyze and modify body mechanics/ergonomics, assess and modify postural abnormalities, address imbalance/dizziness and instruct in home and community integration to attain remaining goals. []  See Plan of Care  []  See progress note/recertification  []  See Discharge Summary         Progress towards goals / Updated goals:  No significant progress noted since initial visit.     PLAN  []  Upgrade activities as tolerated     []  Continue plan of care  []  Update interventions per flow sheet       []  Discharge due to:_  [x]  Other:_  Will see Henrietta Grant PT on 12th for assessment of R LE, L UE and polymyalgia    Ana M Padilla, PT, DPT, [facility] 2/8/2019

## 2019-02-12 ENCOUNTER — HOSPITAL ENCOUNTER (OUTPATIENT)
Dept: PHYSICAL THERAPY | Age: 84
Discharge: HOME | End: 2019-02-12
Payer: MEDICARE

## 2019-02-12 PROCEDURE — 97530 THERAPEUTIC ACTIVITIES: CPT | Performed by: PHYSICAL THERAPIST

## 2019-02-12 PROCEDURE — 97110 THERAPEUTIC EXERCISES: CPT | Performed by: PHYSICAL THERAPIST

## 2019-02-12 NOTE — PROGRESS NOTES
PT DAILY TREATMENT NOTE - [facility] -15    Patient Name: Eyad Monge  Date:2019  : 1935  [x]  Patient  Verified  Payor: [place] MEDICARE / Plan: [place] MEDICARE PART A & B / Product Type: Medicare /    In time:2:00  Out time:3:00   Total Treatment Time (min): 60  Total Timed Codes (min): 60  1:1 Treatment Time ( [address] only): 60   Visit #: 3    Treatment Area: Left hand pain [M79.642]    SUBJECTIVE  Pain Level (0-10 scale): 5-6/10 just took a tylenol  Any medication changes, allergies to medications, adverse drug reactions, diagnosis change, or new procedure performed?: [x] No    [] Yes (see summary sheet for update)  Subjective functional status/changes:   [] No changes reported  Still not sleeping well, \"I am very frustrated\"   See rheumatologist next Tues the , \"She is pretty sure I have rheumatoid arthritis\" Saw Dr. Nora Atkins about left wrist, no cortisone shot    Saw Lymphedema therapist at [facility] last in . Began wearing B thigh high compression stockings closed toe [id number] no silicone border this past week (after years not having to) and wears when she knows she will be very active. Having a lot of difficulty donning even with gloves and to doff. Pt reports that her swelling in her legs is better/best and worse as the day goes on, wrist/hand no change. Following last appt, felt better for about 2 hrs and  stated she was using her hand more. OBJECTIVE         30 min Therapeutic Exercise:  [x] See flow sheet :   Rationale: increase ROM and reduce pain and decrease edema to improve the patients ability to sit to stand and use L UE with less pain present    30        Min         Therapeutic Activity: risk reduction for external infection/cellulitis and falls, S&S  to contact physician, pt education on correct don/doff/care of compression stockings, wear schedule for both UE and LE compression in order to decrease chance of further exacerbation of B LE Lymphedema and L UE edema. With   [] TE   [] TA   [] neuro   [] other: Patient Education: [x] Review HEP    [] Progressed/Changed HEP based on:   [] positioning   [] body mechanics   [] transfers   [] heat/ice application    [] other:      Other Objective/Functional Measures:   Girth (cm)                               Distance from wrist (cm)    R  L  Palm:  17.4  19.1  Wrist:  15  15.6  Forearm:   21.6  21.5   14              Elbow:  23  23  Bicep:  23.9  24.6   32.5  Axilla:         Girth (cm)                                           Distance from posterior calcaneus (cm)    R  L  MTP:  20.4  20.5    Ankle:  20.8  20.6  Calf:  34.4  33.8  32.5  Knee:  37.1  36.8  Thigh:  45.1  44            Pain Level (0-10 scale) post treatment:  4-5 L UE, 3-4 R LE    ASSESSMENT/Changes in Function: Pt was evaluated for B LE lymphedema and L UE edema. Pt presents with exacerbation of B LE lymphedema, and worsening L UE edema, decreased functional use of L UE (dominant) in dressing, cooking, driving, fixing hair, bathing and decreased mobility (transfers, walking) d/t edema and pain as well as difficulty with don/doff of previously prescribed compression garments for LE's. Patient will benefit from skilled PT services to modify and progress therapeutic interventions, address functional mobility deficits, address ROM deficits, address strength deficits, analyze and cue movement patterns, analyze and modify body mechanics/ergonomics, assess and modify postural abnormalities, address imbalance/dizziness and instruct in home and community integration and to effectively manage her lymphedema to attain remaining goals. []  See Plan of Care  []  See progress note/recertification  []  See Discharge Summary         New Goals:  1) Pt will be Independent with skin care routine to decrease risk of infection. 2) Pt will be Independent in don/doff/wearing schedule of light compression.    3) Pt will know which signs and symptoms to report immediately to physician concerning their condition. 4) Pt will be Independent with compression management routine consisting of skin care, decongestive exercises, self manual lymphatic stimulation techniques, and don/doff of appropriate compression garment (s). 5) Pt will be Independent with HEP for edema management, utilizing correct breath pattern, strengthening, and motion exercises. 6) Pt will utilize correct breath and movement patterns during all ADL's involving L UE and WB B LE's with decreased pain by 2-3 levels on NPS.               PLAN  [x]  Upgrade activities as tolerated     [x]  Continue plan of care  []  Update interventions per flow sheet       []  Discharge due to:_  []  Other:_       THOMPSON Claros, OLIVER-CRISTOPHER 2/12/2019

## 2019-02-13 ENCOUNTER — HOSPITAL ENCOUNTER (OUTPATIENT)
Dept: PHYSICAL THERAPY | Age: 84
Discharge: HOME | End: 2019-02-13
Payer: MEDICARE

## 2019-02-13 PROCEDURE — 97140 MANUAL THERAPY 1/> REGIONS: CPT | Performed by: PHYSICAL THERAPIST

## 2019-02-13 PROCEDURE — 97110 THERAPEUTIC EXERCISES: CPT | Performed by: PHYSICAL THERAPIST

## 2019-02-13 NOTE — PROGRESS NOTES
PT DAILY TREATMENT NOTE - [facility] -15    Patient Name: Cabrera Franco  Date:2019  : 1935  [x]  Patient  Verified  Payor: Faustino Dumas / Plan: [place] MEDICARE PART A & B / Product Type: Medicare /    In time12:00  Out time:1:15  Total Treatment Time (min): 75  Total Timed Codes (min): 75  1:1 Treatment Time ( [address] only): 75   Visit #: 4    Treatment Area: Left hand pain [M79.642]    SUBJECTIVE  Pain Level (0-10 scale): 3-4 Left hand/wrist  Any medication changes, allergies to medications, adverse drug reactions, diagnosis change, or new procedure performed?: [] No    [x] Yes (see summary sheet for update)  Subjective functional status/changes:   [] No changes reported  \"very sore when I wake up but I don't hurt as much and was able to reach down and pick something off of floor\" Pt reports difficulty donning and doffing compression stockings. Started back on Prednisone 20mg once a day in AM for 2 weeks and then prescribed to taper down. Pulmonologist appt . OBJECTIVE         45 min Therapeutic Exercise:  [x] See flow sheet :   Rationale: increase ROM and reduce pain to improve the patients ability to sit to stand with less pain present        30      min       Manual Therapy: modified UE MLD with emphasis on proximal clearance.  PROM L elbow, wrist, MCP, gentle digital distraction  Rationale: decrease edema and pain to effectively use extremity during functional tasks (reaching, grooming, dressing, walking)            With   [x] TE   [] TA   [] neuro   [] other: Patient Education: [x] Review HEP    [x] Progressed/Changed HEP based on: subjective an objective assessments   [] positioning   [] body mechanics   [] transfers   [x] cool compress application    [] other:      Other Objective/Functional Measures:   Girth (cm)                               Distance from wrist (cm)      L  Palm:   18.4  Wrist:    15.9  Forearm:     21.9   14              Elbow:    23  Bicep:    24.6   32.5  Axilla: Pain Level (0-10 scale) post treatment: 3 L wrist/hand    ASSESSMENT/Changes in Function:      Patient will continue to benefit from skilled PT services to effectively manage lymphedema and L UE edema, modify and progress therapeutic interventions, address functional mobility deficits, address ROM deficits, address strength deficits, analyze and cue movement patterns, analyze and modify body mechanics/ergonomics, assess and modify postural abnormalities, address imbalance/dizziness and instruct in home and community integration to attain remaining goals.      []  See Plan of Care  []  See progress note/recertification  []  See Discharge Summary         Progress towards goals / Updated goals:  No significant progress noted from last visit    PLAN  [x]  Upgrade activities as tolerated     [x]  Continue plan of care  []  Update interventions per flow sheet       []  Discharge due to:_  []  Other:     THOMPSON Cooney, JEROD 2/13/2019

## 2019-02-18 ENCOUNTER — APPOINTMENT (OUTPATIENT)
Dept: PHYSICAL THERAPY | Age: 84
End: 2019-02-18
Payer: MEDICARE

## 2019-02-19 ENCOUNTER — OFFICE VISIT (OUTPATIENT)
Dept: RHEUMATOLOGY | Age: 84
End: 2019-02-19

## 2019-02-19 VITALS
OXYGEN SATURATION: 93 % | HEIGHT: 61 IN | BODY MASS INDEX: 23.37 KG/M2 | DIASTOLIC BLOOD PRESSURE: 88 MMHG | WEIGHT: 123.8 LBS | RESPIRATION RATE: 16 BRPM | HEART RATE: 89 BPM | SYSTOLIC BLOOD PRESSURE: 148 MMHG | TEMPERATURE: 99.1 F

## 2019-02-19 DIAGNOSIS — M19.90 OSTEOARTHRITIS, UNSPECIFIED OSTEOARTHRITIS TYPE, UNSPECIFIED SITE: ICD-10-CM

## 2019-02-19 DIAGNOSIS — E84.9 CYSTIC FIBROSIS (HCC): ICD-10-CM

## 2019-02-19 DIAGNOSIS — M35.3 POLYMYALGIA RHEUMATICA (HCC): Primary | ICD-10-CM

## 2019-02-19 NOTE — PROGRESS NOTES
REASON FOR VISIT Patient presents for a follow up visit. HISTORY OF DISEASE: Polymyalgia Rheumatica Year of diagnosis: 2019 First visit with me: 1/2019 Cumulative disease manifestations: stiffness in joints,  Pain in shoulders, hip girdle/entire body Positive serologies/labs: Elevated ESR, CrP Negative serologies/labs: CCP, RF Other co-morbidities:  
Relapses:  
  
Past treatment history: 
Prednisone: Prednisone 20 mg oral daily with a taper Non-biologic DMARD:  
Biologic: 
Cyclophosphamide:  
Rituximab: Other: 
 
HISTORY OF PRESENT ILLNESS This is a 82 y.o. female with hx of cervical cancer s/p hysterectomy, neuropathy in feet. Today, the patient complains of pain all over. MHAQ: 1.375 Pain scale: 7.5/10 The patient didn't start the prednisone until a week ago. She has been on it for 7 days now. She is on 20 mg of prednisone right now. She hasn't talked to the [facility] doctor about this as she was in a lot of pain. She has not noticed too much difference with the prednisone. She has mild improvement in the pain. The pain is still worst at night time when she moves. She is taking tylenol with the prednisone now. When she first gets up in the morning, she is very stiff and wobbly. She has pain in hands. After an hour or so, her symptoms improve. During the day time, the pain is tolerable. She has lymphaedema which as returned as well. She is also doing PT with [facility]. The pain is still in the entire body. She has had swelling per the patient in her dorsal aspect of the hand. She notes pain all through her neck into her shoulders and into her arms. She is getting an EMG of her hand next week with orthopedic doctor. The neck has stiffness as well. She is doing exercises for her neck. Sleeping on the side doesn't hurt the tronchanteric bursa on that side REVIEW OF SYSTEMS A comprehensive review of systems was performed and pertinent results are documented in the HPI, review of systems is otherwise non-contributory. PAST MEDICAL HISTORY Past Medical History:  
Diagnosis Date  Chronic insomnia  Cystic fibrosis ([facility] [address] Dr. Pina Galindo, [address] [facility] clinic, Brochiectasis, pancreatic cysts in past  
 GERD (gastroesophageal reflux disease)  HTN (hypertension)  Osteoporosis Past Surgical History:  
Procedure Laterality Date  HX DILATION AND CURETTAGE    
 HX HYSTERECTOMY  HX TONSIL AND ADENOIDECTOMY FAMILY HISTORY Family History Problem Relation Age of Onset  Coronary Artery Disease Mother  Breast Cancer Sister  Cystic Fibrosis Son SOCIAL HISTORY Social History Tobacco Use  Smoking status: Never Smoker  Smokeless tobacco: Never Used Substance Use Topics  Alcohol use: No  
  Frequency: Never  Drug use: No  
 
 
MEDICATIONS Current Outpatient Medications Medication Sig Dispense Refill  predniSONE (DELTASONE) 5 mg tablet Take 20 mg for 2 weeks, then 17.5 mg for 2 weeks, then 15 mg for 2 weeks, then 12.5 mg for 2 weeks 90 Tab 1  
 acetaminophen (TYLENOL PO) Take  by mouth.  diphenhydramine HCl (SLEEP AID, DIPHENHYDRAMINE, PO) Take  by mouth.  dornase alpha (PULMOZYME) 1 mg/mL nebulizer solution Take 2.5 mg by inhalation daily.  vit A/C/E ac/ZnOx/cupric oxide (EYE VITAMIN AND MINERALS PO) Take  by mouth.  hydroCHLOROthiazide (HYDRODIURIL) 25 mg tablet TAKE 1 TABLET BY MOUTH EVERY DAY 90 Tab 1  ibuprofen (MOTRIN) 200 mg tablet Take  by mouth. ALLERGIES Allergies Allergen Reactions  Levothyroxine Other (comments) Hypertension, rash  Shellfish Derived Rash  Tetanus Toxoid, Adsorbed Other (comments) PHYSICAL EXAMINATION Visit Vitals /88 (BP 1 Location: Left arm, BP Patient Position: Sitting) Pulse 89 Temp 99.1 °F (37.3 °C) (Oral) Resp 16 Ht 5' 1\" (1.549 m) Wt 123 lb 12.8 oz (56.2 kg) SpO2 93% BMI 23.39 kg/m² Body mass index is 23.39 kg/m². General: NAD HEENT: PERRL, anicteric, non-injected sclerae; oropharynx without ulcers, erythema, or exudate. Moist mucous membranes. Lymphatic: No cervical or axillary lymphadenopathy. Cardiovascular: S1, S2,no R/M/G Pulmonary: CTA b/l. No wheezes/rales/rhonchi. Abdominal: Soft,NTND, + BS. Skin: No rash, nodules, or periungual changes. + rosacea Neuro: Alert; able to carry normal conversation Musculoskeletal:  
TTP of the upper back and neck Pain with passive and active ROM of the shoulder b/l No swelling any joints B/l CMC arthritis Per the wrist she has swelling in MCPs and wrists. DATA REVIEW Prior medical records were reviewed and if applicable are summarized as below: 
 
Labs:  
1/2019: CMP, cbc with thrombocytosis normal, ESR 64, CRP 30.8, RF, CCP negative Imaging:  
Shoulder xrays (1/2019): negative Left hand xray (12/18): CMC arthritis ASSESSMENT AND PLAN 
 
A 82 y.o. female with history of cervical cancer status post hysterectomy, cystic fibrosis carrier, presents for a follow up visit. Clinically she fits the diagnosis of PMR but her poor response to prednisone is unusual for PMR. I suspect that the patient has PMR along with osteoarthritis which is making her pain worse. I will continue to work up alternate etiologies of her pain. #Arthralgias: as noted, likely PMR but will evaluate her for osteoarthritis as well. CPPD has been ruled out. patient also complains of swelling in the dorsum of her hand, which is concerning for R3SPE but I am unable to see the swelling.   
- will obtain xrays of lumbar and cervical spine. 
- will continue with prednisone taper of 2.5 mg every 2 weeks. - obtain biweekly ESR, CRP. If she has improvement in her lab markers with improvement in pain eventually, this will make PMR likely 
-Up-to-date with malignancy screening. 
-No symptoms for GCA. #Cervical cancer: Appears to be in remission #Cystic fibrosis:  
- not active at this time. # Osteoarthritis: has extensive OA 
- on PT #Medication toxicity monitoring: 
-CBC, CMP normal with mild thrombocytosis # Thrombocytosis: 
- due to inflammation The patient voiced understanding of the aforementioned assessment and plan. Summary of plan was provided in the After Visit Summary patient instructions. I also provided education about MyChart setup and utility. Ms. Rob Darby has a reminder for a \"due or due soon\" health maintenance. I have asked that she contact her primary care provider for follow-up on this health maintenance. TODAY'S ORDERS Orders Placed This Encounter  XR SPINE LUMB 2 OR 3 V  
 XR SPINE CERV 4 OR 5 V  
 SED RATE (ESR)  C REACTIVE PROTEIN, QT Future Appointments Date Time Provider Toma Newman 2/21/2019  9:00 AM Gretchen Antonysiephine Chazy [facility]  
2/26/2019  9:00 AM Arpan Josiephine Chazy [facility]  
3/5/2019 10:00 AM Arpan Josiephine Chazy [facility]  
3/7/2019  9:00 AM Riddorebecca Josiephine Chazy [facility]  
3/12/2019  1:00 PM Arpan Josiephine Chazy [facility]  
3/14/2019  9:00 AM Ridnaida, Josiephine Chazy [facility]  
3/19/2019  9:00 AM Riddorebecca Josiephine Chazy [facility]  
3/21/2019  9:00 AM Ridnaida Josiephine Chazy [facility]  
3/26/2019  9:00 AM Riddorebecca, Josiephine Chazy [facility]  
3/28/2019  9:00 AM Frederic Antony 64 Kimberly Mcfarlane MD 
 
Adult Rheumatology [facility] A Part of [address], [address] Phone [phone] Fax [phone]

## 2019-02-19 NOTE — PATIENT INSTRUCTIONS
Please continue with prednisone taper by 2.5 mg every 2 weeks. Please get the blood work done next week and then every 2 weeks after.

## 2019-02-21 ENCOUNTER — HOSPITAL ENCOUNTER (OUTPATIENT)
Dept: PHYSICAL THERAPY | Age: 84
Discharge: HOME | End: 2019-02-21
Payer: MEDICARE

## 2019-02-21 PROCEDURE — 97140 MANUAL THERAPY 1/> REGIONS: CPT | Performed by: PHYSICAL THERAPIST

## 2019-02-21 PROCEDURE — 97110 THERAPEUTIC EXERCISES: CPT | Performed by: PHYSICAL THERAPIST

## 2019-02-21 NOTE — PROGRESS NOTES
PT DAILY TREATMENT NOTE - [facility] 2-15    Patient Name: Laya Owens  Date:2019  : 1935  [x]  Patient  Verified  Payor: Siobhan Gomez / Plan: [place] MED[place] & [place] / Product Type: Medicare /    In time9:00  Out time:10:00  Total Treatment Time (min): 60  Total Timed Codes (min): 60  1:1 Treatment Time ( [address] only): 60  Visit #: 5    Treatment Area: Left hand pain [M79.642]    SUBJECTIVE  Pain Level (0-10 scale): 2-3 Left hand/wrist 4 upon wakening  Any medication changes, allergies to medications, adverse drug reactions, diagnosis change, or new procedure performed?: [] No    [x] Yes (see summary sheet for update)  Subjective functional status/changes:   [] No changes reported     Pt reports that she saw rheumatologist on the  and states she is more \"confused\" and stated that doctor said she should have had relief from Prednisone by now. More blood work is scheduled. \"I am so happy that my hand wrist swelling has gone down and stayed down and hurts less\"  \"You have helped me so much\"    OBJECTIVE         30 min Therapeutic Exercise:  [x] See flow sheet :   Rationale: increase ROM and reduce pain to improve the patients ability to sit to stand with less pain present        30      min       Manual Therapy: modified UE MLD with emphasis on proximal clearance.  PROM L elbow, wrist, MCP, gentle digital distraction  Rationale: decrease edema and pain to effectively use extremity during functional tasks (reaching, grooming, dressing, walking)            With   [x] TE   [] TA   [] neuro   [] other: Patient Education: [x] Review HEP    [x] Progressed/Changed HEP based on: subjective an objective assessments   [] positioning   [] body mechanics   [] transfers   [x] cool compress application    [] other:      Other Objective/Functional Measures:   Girth (cm)                               Distance from wrist (cm)      L  Palm:   18.3  Wrist:    15.3  Forearm:     21.3   14              Elbow:    23  Bicep: 24.6   32.5       Pain Level (0-10 scale) post treatment: 2 L wrist/hand    ASSESSMENT/Changes in Function: Pt had reduction in circumferential measurements in L UE and reported more flexibility in L hand. No increased pain with progression of exercises     Patient will continue to benefit from skilled PT services to effectively manage lymphedema and L UE edema, modify and progress therapeutic interventions, address functional mobility deficits, address ROM deficits, address strength deficits, analyze and cue movement patterns, analyze and modify body mechanics/ergonomics, assess and modify postural abnormalities, address imbalance/dizziness and instruct in home and community integration to attain remaining goals. []  See Plan of Care  []  See progress note/recertification  []  See Discharge Summary         Progress towards goals / Updated goals:  1) Pt will be Independent with skin care routine to decrease risk of infection. MET  2) Pt will be Independent in don/doff/wearing schedule of light compression. MET  3) Pt will know which signs and symptoms to report immediately to physician concerning their condition. MET        PLAN  [x]  Upgrade activities as tolerated     [x]  Continue plan of care  []  Update interventions per flow sheet       []  Discharge due to:_  []  Other:     THOMPSON Camejo, OLIVER-CRISTOPHER 2/21/2019

## 2019-02-26 ENCOUNTER — HOSPITAL ENCOUNTER (OUTPATIENT)
Dept: LAB | Age: 84
Discharge: HOME | End: 2019-02-26
Payer: MEDICARE

## 2019-02-26 ENCOUNTER — HOSPITAL ENCOUNTER (OUTPATIENT)
Dept: PHYSICAL THERAPY | Age: 84
Discharge: HOME | End: 2019-02-26
Payer: MEDICARE

## 2019-02-26 PROCEDURE — 97110 THERAPEUTIC EXERCISES: CPT | Performed by: PHYSICAL THERAPIST

## 2019-02-26 PROCEDURE — 85651 RBC SED RATE NONAUTOMATED: CPT

## 2019-02-26 PROCEDURE — 97140 MANUAL THERAPY 1/> REGIONS: CPT | Performed by: PHYSICAL THERAPIST

## 2019-02-26 PROCEDURE — 86140 C-REACTIVE PROTEIN: CPT

## 2019-02-26 PROCEDURE — 36415 COLL VENOUS BLD VENIPUNCTURE: CPT

## 2019-02-26 NOTE — PROGRESS NOTES
PT DAILY TREATMENT NOTE - [facility] 2-15    Patient Name: Abi Christensen  Date:2019  : 1935  [x]  Patient  Verified  Payor: [place] MEDICARE / Plan: [place] MEDICARE PART A & B / Product Type: Medicare /    In time11:00  Out time:12:00  Total Treatment Time (min): 60  Total Timed Codes (min): 60  1:1 Treatment Time ( only): 60  Visit #: 6    Treatment Area: Left hand pain [M79.642]    SUBJECTIVE  Pain Level (0-10 scale): B shoulder pain= 9-10 at night currently=5/10 L hand=3 currently  Any medication changes, allergies to medications, adverse drug reactions, diagnosis change, or new procedure performed?: [x] No    [] Yes (see summary sheet for update)  Subjective functional status/changes:   [] No changes reported     Pt continues to report symptoms are worse at night and upon wakening, \"very stiff, can't make fists and running a fever b/n 99 and 100.3\" She continues to have issues with dressing and wakes up periodically to move and pain is \"excruciating\" Pt had labs this morning and goes for a nerve conduction test this afternoon as well as sees the orthopedist.  Pt very frustrated with being tired and fatigued and \"not knowing what is really going on\" Pt unable to be as active as she once was and hasn't noticed a reduction in pain while on prednisone.   She now weighs 122.4 down from 131 at Thanksgiving but her leg swelling has decreased so \"that might be why\"    OBJECTIVE         30 min Therapeutic Exercise:  [x] See flow sheet :   Rationale: increase ROM and reduce pain to improve the patients ability to sit to stand with less pain present      30 min Manual Therapy:  Proximal lymphatic clearance B upper quadrants, STM B UT/lev scap, rhomboids, scap upward rotation, TrP B subscap, teres major, pec minor/major   Rationale: decrease pain, increase ROM, increase tissue extensibility, decrease edema , decrease trigger points and increase postural awareness to effectively use extremity during functional tasks (reaching, grooming, dressing, walking)                     With   [x] TE   [] TA   [] neuro   [] other: Patient Education: [x] Review HEP    [] Progressed/Changed HEP based on: subjective an objective assessments   [] positioning   [] body mechanics   [] transfers   [] cool compress application    [] other:      Other Objective/Functional Measures:   Girth (cm)                                     L  Palm:   18.4  Wrist:    15.3    Pain Level (0-10 scale) post treatment: 4/10 shoulders, 2/10 left UE    ASSESSMENT/Changes in Function:Pt had poor tolerance to PROM and gentle MFR to B upper quadrants with increased sensitivity in B axilla, subscap, and teres major m. Added and modified HEP. Pt reported decreased pain at all UE sites after completion of therapy. Patient will continue to benefit from skilled PT services to effectively manage lymphedema and L UE edema, modify and progress therapeutic interventions, address functional mobility deficits, address ROM deficits, address strength deficits, analyze and cue movement patterns, analyze and modify body mechanics/ergonomics, assess and modify postural abnormalities, address imbalance/dizziness and instruct in home and community integration to attain remaining goals.      []  See Plan of Care  []  See progress note/recertification  []  See Discharge Summary         Progress towards goals / Updated goals:  No progress towards goals this visit    PLAN  [x]  Upgrade activities as tolerated     [x]  Continue plan of care  []  Update interventions per flow sheet       []  Discharge due to:_  [x]  Other: Refax POC to referring physician     THOMPSON Robert CLT-LANA 2/26/2019

## 2019-02-27 LAB
CRP SERPL-MCNC: 14.9 MG/L (ref 0–4.9)
ERYTHROCYTE [SEDIMENTATION RATE] IN BLOOD BY WESTERGREN METHOD: 61 MM/HR (ref 0–40)

## 2019-02-28 ENCOUNTER — APPOINTMENT (OUTPATIENT)
Dept: PHYSICAL THERAPY | Age: 84
End: 2019-02-28
Payer: MEDICARE

## 2019-03-05 ENCOUNTER — HOSPITAL ENCOUNTER (OUTPATIENT)
Dept: PHYSICAL THERAPY | Age: 84
Discharge: HOME | End: 2019-03-05
Payer: MEDICARE

## 2019-03-05 PROCEDURE — 97110 THERAPEUTIC EXERCISES: CPT | Performed by: PHYSICAL THERAPIST

## 2019-03-05 NOTE — PROGRESS NOTES
[facility]  [address] [facility] Radha Guerrero  Phone: [phone]      Fax:  [phone]    Progress Note    Name: Marcela Fournier   : 1935   MD: Margo Patel MD       Treatment Diagnosis: Left hand pain [M79.642]  Start of Care: 2019    Visits from Start of Care: 7  Missed Visits: 0    Summary of Care:Pt has been receiving PT interventions for L UE pain and edema. She has been compliant with all HEP as well as appropriate management of Lymphedema in B LE's. Her L UE has had reduction in edema and is now within 1 cm of R UE. Pt reports increased use of B UE's with decreased pain    Assessment / Recommendations: Recommend pt continue PT for 1-2xwk for 4-5 additional weeks to complete all rehab goals. GOAL STATUS:  1) Pt will be Independent with skin care routine to decrease risk of infection. MET  2) Pt will be Independent in don/doff/wearing schedule of light compression. MET  3) Pt will know which signs and symptoms to report immediately to physician concerning their condition. MET  4) Pt will be Independent with compression management routine consisting of skin care, decongestive exercises, self manual lymphatic stimulation techniques, and don/doff of appropriate compression garment (s). PROGRESSING  5) Pt will be Independent with HEP for edema management, utilizing correct breath pattern, strengthening, and motion exercises. PROGRESSING  6) Pt will utilize correct breath and movement patterns during all ADL's involving L UE and WB B LE's with decreased pain by 2-3 levels on NPS. PROGRESSING  7) Pt will demo sit to stand transfer with no UE assistance and <2/10 pn MET  8) Pt will demo premorbid mobility and ADL abilties at the time of D/C  9)Pt will demo the ability to perform cervical and shoulder AROM to allow for safe driving, dressing ,and household chores independently Sheila 40 EVELINE MACKAYT-CRISTOPHER 3/5/2019     ________________________________________________________________________  NOTE TO PHYSICIAN:  Please complete the following and fax to: [facility]: Fax: [phone]. Lucio Avalos Retain this original for your records. If you are unable to process this request in 24 hours, please contact our office.        ____ I have read the above report and request that my patient continue therapy with the following changes/special instructions:  ____ I have read the above report and request that my patient be discharged from therapy    Physician's Signature:_________________ Date:___________Time:__________

## 2019-03-05 NOTE — PROGRESS NOTES
PT DAILY TREATMENT NOTE - [facility] 2-15    Patient Name: Dwaine Harmon  Date:3/5/2019  : 1935  [x]  Patient  Verified  Payor: VA MEDICARE / Plan: [place] MEDICARE PART A & B / Product Type: Medicare /    In time11:00  Out time:12:00  Total Treatment Time (min): 60  Total Timed Codes (min): 60  1:1 Treatment Time ( only): 60  Visit #: 6    Treatment Area: Left hand pain [M79.642]    SUBJECTIVE  Pain Level (0-10 scale): 2-3 pain and reports less pain and more stiffness  Any medication changes, allergies to medications, adverse drug reactions, diagnosis change, or new procedure performed?: [x] No    [] Yes (see summary sheet for update)  Subjective functional status/changes:   [] No changes reported   Pt reports that her neck and shoulders and \"I have more mobility but still have trouble with strength in my hands and arms, which is frustrating\"  Still wakes up stiff and sore all over but  is waking her up less frequently than before  Saw orthopedist about L CTS and had an EMG performed which was negative and she has been d/c'd   Saw rheumatologist and is still on prednisone since labs showed a slight decrease in sed rate.  She has another appt for labs to be done and to see Rheumatologist on 3/13/19  Pt is able to don/doff B LE stockings with decreased difficulty and no longer needs help from spouse, gloves are helping  Pt reports that she is \"able to straighten fingers better\"   OBJECTIVE         60 min Therapeutic Exercise:  [x] See flow sheet :   Rationale: increase ROM and reduce pain to improve the patients ability to sit to stand with less pain present                    With   [x] TE   [] TA   [] neuro   [] other: Patient Education: [x] Review HEP    [x] Progressed/Changed HEP based on: subjective an objective assessments   [] positioning   [] body mechanics   [] transfers   [] cool compress application    [] other:      Other Objective/Functional Measures:   Girth (cm) L  Palm:   18.4  Wrist:    15.2     Strength L=15.4 R=16.3                               Cervical AROM:                                                                                                                  R                      L  Flexion                                                                                       Extension                                                                                                          Side Bending                                                                                                     Rotation                                                                                                               UPPER QUARTER                             MUSCLE STRENGTH  KEY                                                                             R                      L  0 - No Contraction                   C1, C2 Neck Flex  2+                                  1 - Trace                                  C3 Side Flex   2+                                         2 - Poor                                   C4 Sh Elev    3                                           3 - Fair                                     C5 Deltoid/Biceps       4                     4-  4 - Good                                  C6 Wrist Ext                4                    4-  5 - Normal                               C7 Triceps                   4-                     4-                                                  C8 Thumb Ext             4                     3+                                                  T1 Hand Inst                4                     4-                                                                             UE AROM:         R   L  Flexion    100   93  Extension   5   0  Abd    83   80  Add    5   3  IR    18   10  ER    23   18         Neurological: Reflexes / Sensations: L dorsal hand and wrist skin decreased sensitivity to touch           Pain Level (0-10 scale) post treatment: 2 neck, shoulders and hands    ASSESSMENT/Changes in Function: No change in circumferential measurements. Pt is progressing towards rehab goals with improved motion and function in B UE's. Patient will continue to benefit from skilled PT services to effectively manage lymphedema and L UE edema, modify and progress therapeutic interventions, address functional mobility deficits, address ROM deficits, address strength deficits, analyze and cue movement patterns, analyze and modify body mechanics/ergonomics, assess and modify postural abnormalities, address imbalance/dizziness and instruct in home and community integration to attain remaining goals.      []  See Plan of Care  [x]  See progress note/recertification  []  See Discharge Summary         Progress towards goals / Updated goals:  See progress note    PLAN  [x]  Upgrade activities as tolerated     [x]  Continue plan of care  []  Update interventions per flow sheet       []  Discharge due to:_  [x]  Other:      Rahul Pod, MSPT, CLT-CRISTOPHER 3/5/2019

## 2019-03-07 ENCOUNTER — HOSPITAL ENCOUNTER (OUTPATIENT)
Dept: PHYSICAL THERAPY | Age: 84
Discharge: HOME | End: 2019-03-07
Payer: MEDICARE

## 2019-03-07 PROCEDURE — 97110 THERAPEUTIC EXERCISES: CPT | Performed by: PHYSICAL THERAPIST

## 2019-03-07 NOTE — PROGRESS NOTES
PT DAILY TREATMENT NOTE - [facility] 2-15    Patient Name: Fabio Laurent  Date:3/7/2019  : 1935  [x]  Patient  Verified  Payor: Cristina Lazar / Plan: [place] MEDIC[place] PART [place] & B / Product Type: Medicare /    In time9:00  Out time:10:00  Total Treatment Time (min): 60  Total Timed Codes (min): 60  1:1 Treatment Time ( [address] only): 60  Visit #: 8    Treatment Area: Left hand pain [M79.642]    SUBJECTIVE  Pain Level (0-10 scale): 2  Any medication changes, allergies to medications, adverse drug reactions, diagnosis change, or new procedure performed?: [x] No    [] Yes (see summary sheet for update)  Subjective functional status/changes:   [] No changes reported     \"A little sore in L hand following last tx when I use it to  things. OBJECTIVE         60 min Therapeutic Exercise:  [x] See flow sheet :   Rationale: increase ROM and reduce pain to improve the patients ability to sit to stand with less pain present                    With   [x] TE   [] TA   [] neuro   [] other: Patient Education: [x] Review HEP    [x] Progressed/Changed HEP based on: subjective an objective assessments   [] positioning   [] body mechanics   [] transfers   [] cool compress application    [] other:                                                                                                             Pain Level (0-10 scale) post treatment: 2 neck, shoulders and hands    ASSESSMENT/Changes in Function:  Emphasis of exercises and modifications to HEP on decreasing gripping exercises. Pt tolerated AROM of B upper quadrants with no increased pain.       Patient will continue to benefit from skilled PT services to effectively manage lymphedema and L UE edema, modify and progress therapeutic interventions, address functional mobility deficits, address ROM deficits, address strength deficits, analyze and cue movement patterns, analyze and modify body mechanics/ergonomics, assess and modify postural abnormalities, address imbalance/dizziness and instruct in home and community integration to attain remaining goals.      []  See Plan of Care  []  See progress note/recertification  []  See Discharge Summary         Progress towards goals / Updated goals:  No additional progress made since last visit    PLAN  [x]  Upgrade activities as tolerated     [x]  Continue plan of care  []  Update interventions per flow sheet       []  Discharge due to:_  []  Other:      THOMPSON Olivarez, OLIVER-CRISTOPHER 3/7/2019

## 2019-03-12 ENCOUNTER — APPOINTMENT (OUTPATIENT)
Dept: PHYSICAL THERAPY | Age: 84
End: 2019-03-12
Payer: MEDICARE

## 2019-03-12 ENCOUNTER — HOSPITAL ENCOUNTER (OUTPATIENT)
Dept: LAB | Age: 84
Discharge: HOME | End: 2019-03-12
Payer: MEDICARE

## 2019-03-12 PROCEDURE — 86140 C-REACTIVE PROTEIN: CPT

## 2019-03-12 PROCEDURE — 36415 COLL VENOUS BLD VENIPUNCTURE: CPT

## 2019-03-12 PROCEDURE — 85651 RBC SED RATE NONAUTOMATED: CPT

## 2019-03-13 LAB
CRP SERPL-MCNC: 9.4 MG/L (ref 0–4.9)
ERYTHROCYTE [SEDIMENTATION RATE] IN BLOOD BY WESTERGREN METHOD: 20 MM/HR (ref 0–40)

## 2019-03-14 ENCOUNTER — HOSPITAL ENCOUNTER (OUTPATIENT)
Dept: PHYSICAL THERAPY | Age: 84
Discharge: HOME | End: 2019-03-14
Payer: MEDICARE

## 2019-03-14 PROCEDURE — 97110 THERAPEUTIC EXERCISES: CPT | Performed by: PHYSICAL THERAPIST

## 2019-03-14 PROCEDURE — 97140 MANUAL THERAPY 1/> REGIONS: CPT | Performed by: PHYSICAL THERAPIST

## 2019-03-14 NOTE — PROGRESS NOTES
PT DAILY TREATMENT NOTE - [facility] 2-15    Patient Name: Bruce Ahmadi  Date:3/14/2019  : 1935  [x]  Patient  Verified  Payor: Tosha Damian / Plan: [place] MEDICARE PART A & B / Product Type: Medicare /    In time9:00  Out time:10:00  Total Treatment Time (min): 60  Total Timed Codes (min): 60  1:1 Treatment Time ( [address] only): 60  Visit #: 8    Treatment Area: Left hand pain [M79.642]    SUBJECTIVE  Pain Level (0-10 scale): 2  Any medication changes, allergies to medications, adverse drug reactions, diagnosis change, or new procedure performed?: [x] No    [] Yes (see summary sheet for update)  Subjective functional status/changes:   [] No changes reported     Pt's recent lab reports came in and her inflammatory markers continue to trend down and pt continues on prednisone taper.  Pt reports that she is \"maybe 90% back to what I was before but my  isn't very strong and I continue to drop things, haven't tried vacuuming yet\"     OBJECTIVE         45 min Therapeutic Exercise:  [x] See flow sheet :   Rationale: increase ROM and reduce pain to improve the patients ability to sit to stand with less pain present       15 min Manual Therapy:  STM B UT/LS, subscap/TM, PROM B g-h jts with elbow flexion   Rationale: decrease pain, increase ROM, increase tissue extensibility, decrease trigger points and increase postural awareness to effectively use extremity during functional tasks (reaching, grooming, dressing, walking)                     With   [] TE   [] TA   [] neuro   [] other: Patient Education: [x] Review HEP    [] Progressed/Changed HEP based on:    [] positioning   [] body mechanics   [] transfers   [] cool compress application    [] other:                                                                                                             Pain Level (0-10 scale) post treatment: 1 \"looser, more movement, less pain\"    ASSESSMENT/Changes in Function:  Pt exhibited decreased muscle guarding during pulleys, shoulder elevation and STM, improved tolerance to all therapeutic ex's today with reports that pt felt better after therapy session today. Patient will continue to benefit from skilled PT services to effectively manage lymphedema and L UE edema, modify and progress therapeutic interventions, address functional mobility deficits, address ROM deficits, address strength deficits, analyze and cue movement patterns, analyze and modify body mechanics/ergonomics, assess and modify postural abnormalities, address imbalance/dizziness and instruct in home and community integration to attain remaining goals.      []  See Plan of Care  []  See progress note/recertification  []  See Discharge Summary           PLAN  [x]  Upgrade activities as tolerated     [x]  Continue plan of care  []  Update interventions per flow sheet       []  Discharge due to:_  []  Other:      THOMPSON Moreno, OLIVER-CRISTOPHER 3/14/2019

## 2019-03-19 ENCOUNTER — HOSPITAL ENCOUNTER (OUTPATIENT)
Dept: PHYSICAL THERAPY | Age: 84
Discharge: HOME | End: 2019-03-19
Payer: MEDICARE

## 2019-03-19 ENCOUNTER — OFFICE VISIT (OUTPATIENT)
Dept: RHEUMATOLOGY | Age: 84
End: 2019-03-19

## 2019-03-19 VITALS
HEIGHT: 61 IN | RESPIRATION RATE: 16 BRPM | SYSTOLIC BLOOD PRESSURE: 112 MMHG | DIASTOLIC BLOOD PRESSURE: 61 MMHG | TEMPERATURE: 99.3 F | BODY MASS INDEX: 23.75 KG/M2 | OXYGEN SATURATION: 93 % | WEIGHT: 125.8 LBS | HEART RATE: 100 BPM

## 2019-03-19 DIAGNOSIS — M19.042 PRIMARY OSTEOARTHRITIS OF BOTH HANDS: ICD-10-CM

## 2019-03-19 DIAGNOSIS — M19.041 PRIMARY OSTEOARTHRITIS OF BOTH HANDS: ICD-10-CM

## 2019-03-19 DIAGNOSIS — M35.3 POLYMYALGIA RHEUMATICA (HCC): ICD-10-CM

## 2019-03-19 DIAGNOSIS — Z79.60 LONG-TERM USE OF IMMUNOSUPPRESSANT MEDICATION: Primary | ICD-10-CM

## 2019-03-19 PROCEDURE — 97140 MANUAL THERAPY 1/> REGIONS: CPT | Performed by: PHYSICAL THERAPIST

## 2019-03-19 PROCEDURE — 97110 THERAPEUTIC EXERCISES: CPT | Performed by: PHYSICAL THERAPIST

## 2019-03-19 NOTE — PROGRESS NOTES
REASON FOR VISIT Patient presents for a follow up visit. HISTORY OF DISEASE: Polymyalgia Rheumatica Year of diagnosis: 2019 First visit with me: 1/2019 Cumulative disease manifestations: stiffness in joints,  Pain in shoulders, hip girdle/entire body Positive serologies/labs: Elevated ESR, CrP Negative serologies/labs: CCP, RF Other co-morbidities:  
Relapses:  
  
Past treatment history: 
Prednisone: Prednisone 20 mg oral daily with a taper Non-biologic DMARD:  
Biologic: 
Cyclophosphamide:  
Rituximab: Other: 
 
HISTORY OF PRESENT ILLNESS This is a 82 y.o. female with hx of cervical cancer s/p hysterectomy, neuropathy in feet. Today, the patient complains of pain all over. MHAQ: 0.375 Pain scale: 2/10 The patient notes that her pain in joints has decreased a lot. Her pain is mainly in her left shoulder only now. She just rolls over and then the pain goes away. The pain is more of an aching pain now and she feels weak in the hands. She has been going to PT for about 10 weeks now and she is working on improving the strength and its slowly getting better. No swollen joints. She is currently on 15 mg of oral prednisone daily. She had the EMG and it didn't show carpal tunnel syndrome. PT thinks she still has tight muscles and has been having massages. REVIEW OF SYSTEMS A comprehensive review of systems was performed and pertinent results are documented in the HPI, review of systems is otherwise non-contributory. PAST MEDICAL HISTORY Past Medical History:  
Diagnosis Date  Chronic insomnia  Cystic fibrosis ([facility] [address] Dr. Bella Templeton, [facility] CF clinic, Brochiectasis, pancreatic cysts in past  
 GERD (gastroesophageal reflux disease)  HTN (hypertension)  Osteoporosis Past Surgical History:  
Procedure Laterality Date  HX DILATION AND CURETTAGE    
 HX HYSTERECTOMY  HX TONSIL AND ADENOIDECTOMY FAMILY HISTORY Family History Problem Relation Age of Onset  Coronary Artery Disease Mother  Breast Cancer Sister  Cystic Fibrosis Son SOCIAL HISTORY Social History Tobacco Use  Smoking status: Never Smoker  Smokeless tobacco: Never Used Substance Use Topics  Alcohol use: No  
  Frequency: Never  Drug use: No  
 
 
MEDICATIONS Current Outpatient Medications Medication Sig Dispense Refill  predniSONE (DELTASONE) 5 mg tablet Take 20 mg for 2 weeks, then 17.5 mg for 2 weeks, then 15 mg for 2 weeks, then 12.5 mg for 2 weeks (Patient taking differently: 15 mg daily. Take 20 mg for 2 weeks, then 17.5 mg for 2 weeks, then 15 mg for 2 weeks, then 12.5 mg for 2 weeks) 90 Tab 1  
 acetaminophen (TYLENOL PO) Take  by mouth.  diphenhydramine HCl (SLEEP AID, DIPHENHYDRAMINE, PO) Take  by mouth.  dornase alpha (PULMOZYME) 1 mg/mL nebulizer solution Take 2.5 mg by inhalation daily.  vit A/C/E ac/ZnOx/cupric oxide (EYE VITAMIN AND MINERALS PO) Take  by mouth.  hydroCHLOROthiazide (HYDRODIURIL) 25 mg tablet TAKE 1 TABLET BY MOUTH EVERY DAY 90 Tab 1 ALLERGIES Allergies Allergen Reactions  Levothyroxine Other (comments) Hypertension, rash  Shellfish Derived Rash  Tetanus Toxoid, Adsorbed Other (comments) PHYSICAL EXAMINATION Visit Vitals /61 (BP 1 Location: Left arm, BP Patient Position: Sitting) Pulse 100 Temp 99.3 °F (37.4 °C) (Oral) Resp 16 Ht 5' 1\" (1.549 m) Wt 125 lb 12.8 oz (57.1 kg) SpO2 93% BMI 23.77 kg/m² Body mass index is 23.77 kg/m². General: NAD HEENT: PERRL, anicteric, non-injected sclerae; oropharynx without ulcers, erythema, or exudate. Moist mucous membranes. Lymphatic: No cervical or axillary lymphadenopathy. Cardiovascular: S1, S2,no R/M/G Pulmonary: CTA b/l. No wheezes/rales/rhonchi. Abdominal: Soft,NTND, + BS. Skin: No rash, nodules, or periungual changes. Neuro: Alert; able to carry normal conversation Musculoskeletal:  
ROM of b/l shoulder improved with less pain No swelling any joints B/l CMC arthritis B/l heberden nodes noted DATA REVIEW Prior medical records were reviewed and if applicable are summarized as below: 
 
Labs:  
3/2019: ESR 20, CRP 9.4 
2/2019: ESR 61, CRP 14.9,  
1/2019: CMP, cbc with thrombocytosis normal, ESR 64, CRP 30.8, RF, CCP negative Imaging:  
Cervical spine xray (2/2019): [facility] Lumbar spine xray (2/2019); [facility] Shoulder xrays (1/2019): negative Left hand xray (12/18): [facility] arthritis ASSESSMENT AND PLAN 
 
A 82 y.o. female with history of cervical cancer status post hysterectomy, cystic fibrosis carrier, polymyalgia rheumatica on prednisone taper presents for a follow up visit. She is doing well and her inflammatory markers are trending down with the prednisone. # Polymyalgia Rheumatica:  
- will continue with prednisone taper of 2.5 mg every 2 weeks. - obtain biweekly ESR, CRP. 
-Up-to-date with malignancy screening. 
-No symptoms for GCA. - explained to the patient that she might never be completely pain free as she has significant OA in her cervical spine. #Cervical cancer: Appears to be in remission #Cystic fibrosis:  
- not active at this time. # Osteoarthritis: has extensive OA of hands, cervical spine, lumbar spine.  
- doing well with PT #Medication toxicity monitoring: 
- repeat cbc, cmp at next visit. # Thrombocytosis: 
- due to inflammation The patient voiced understanding of the aforementioned assessment and plan. Summary of plan was provided in the After Visit Summary patient instructions. I also provided education about MyChart setup and utility. Ms. Stefany Morin has a reminder for a \"due or due soon\" health maintenance. I have asked that she contact her primary care provider for follow-up on this health maintenance. TODAY'S ORDERS No orders of the defined types were placed in this encounter. Future Appointments Date Time Provider Toma Newman 3/21/2019  9:00 AM Amie Antony [facility]  
3/26/2019  9:00 AM Amie Antony [facility]  
3/28/2019  9:00 AM Amie Antony [facility]  
5/20/2019  1:30 PM Mesha Beltran MD [id number] [facility] Tiffanie Padilla MD 
 
Adult Rheumatology [facility], [address] [place] [address] Phone [phone] Fax [phone]

## 2019-03-19 NOTE — PROGRESS NOTES
PT DAILY TREATMENT NOTE - [facility] 2-15    Patient Name: Fabio Laurent  Date:3/19/2019  : 1935  [x]  Patient  Verified  Payor: Cristina Lazar / Plan: [place] MEDICARE PART A & B / Product Type: Medicare /    In time9:00  Out time:10:00  Total Treatment Time (min): 60  Total Timed Codes (min): 60  1:1 Treatment Time ( only): 60  Visit #: 10    Treatment Area: Left hand pain [M79.642]    SUBJECTIVE  Pain Level (0-10 scale): 1-2 L arm/shoulder  Any medication changes, allergies to medications, adverse drug reactions, diagnosis change, or new procedure performed?: [x] No    [] Yes (see summary sheet for update)  Subjective functional status/changes:   [] No changes reported   \"I can definitely tell the difference since coming to PT and I only wake once per night now\"    OBJECTIVE         45 min Therapeutic Exercise:  [x] See flow sheet :   Rationale: increase ROM and reduce pain to improve the patients ability to sit to stand with less pain present       15 min Manual Therapy:  STM B UT/LS, subscap/TM, PROM B g-h jts with elbow flexion   Rationale: decrease pain, increase ROM, increase tissue extensibility, decrease trigger points and increase postural awareness to effectively use extremity during functional tasks (reaching, grooming, dressing, walking)                     With   [] TE   [] TA   [] neuro   [] other: Patient Education: [x] Review HEP    [] Progressed/Changed HEP based on:    [] positioning   [] body mechanics   [] transfers   [] cool compress application    [] other:                                                                                                             Other Objective Measurements: AROM L shoulder 134flex  R= 140 flex  Pain Level (0-10 scale) post treatment: 1    ASSESSMENT/Changes in Function:  Pt able to begin isometric scapular strengthening ex's, and low weights to existing strengthening shoulder, arm and wrist ex's.   Pt exhibited decreased muscle guarding and reports of TTP in B periscapular musculature. Patient will continue to benefit from skilled PT services to effectively manage lymphedema and L UE edema, modify and progress therapeutic interventions, address functional mobility deficits, address ROM deficits, address strength deficits, analyze and cue movement patterns, analyze and modify body mechanics/ergonomics, assess and modify postural abnormalities, address imbalance/dizziness and instruct in home and community integration to attain remaining goals.      []  See Plan of Care  []  See progress note/recertification  []  See Discharge Summary           PLAN  [x]  Upgrade activities as tolerated     [x]  Continue plan of care  []  Update interventions per flow sheet       []  Discharge due to:_  []  Other:      Ganesh Moyer, MSPT, OLIVER-CRISTOPHER 3/19/2019

## 2019-03-19 NOTE — PROGRESS NOTES
Chief Complaint Patient presents with  Joint Pain 1. Have you been to the ER, urgent care clinic since your last visit? Hospitalized since your last visit? No 
 
2. Have you seen or consulted any other health care providers outside of the [address] [place] [address] since your last visit? Include any pap smears or colon screening.  No

## 2019-03-21 ENCOUNTER — HOSPITAL ENCOUNTER (OUTPATIENT)
Dept: PHYSICAL THERAPY | Age: 84
Discharge: HOME | End: 2019-03-21
Payer: MEDICARE

## 2019-03-21 PROCEDURE — 97110 THERAPEUTIC EXERCISES: CPT | Performed by: PHYSICAL THERAPIST

## 2019-03-21 PROCEDURE — 97140 MANUAL THERAPY 1/> REGIONS: CPT | Performed by: PHYSICAL THERAPIST

## 2019-03-21 NOTE — PROGRESS NOTES
PT DAILY TREATMENT NOTE - [facility] 2-15    Patient Name: Ankur Frausto  Date:3/21/2019  : 1935  [x]  Patient  Verified  Payor: [address] [place] [address] / Plan: [place] MEDICARE PART A & B / Product Type: Medicare /    In time9:00  Out time:10:00  Total Treatment Time (min): 60  Total Timed Codes (min): 60  1:1 Treatment Time ( [address] only): 60  Visit #: 11    Treatment Area: Left hand pain [M79.642]    SUBJECTIVE  Pain Level (0-10 scale): 2.5/10 L arm/shoulder  Any medication changes, allergies to medications, adverse drug reactions, diagnosis change, or new procedure performed?: [x] No    [] Yes (see summary sheet for update)  Subjective functional status/changes:   [] No changes reported  Pt reports that she saw her doctor and she is supposed to continue on prednisone and will get her blood work tested next week.           OBJECTIVE         45 min Therapeutic Exercise:  [x] See flow sheet :   Rationale: increase ROM and reduce pain to improve the patients ability to sit to stand with less pain present       15 min Manual Therapy:  STM B UT/LS, subscap/TM, PROM B g-h jts with elbow flexion   Rationale: decrease pain, increase ROM, increase tissue extensibility, decrease trigger points and increase postural awareness to effectively use extremity during functional tasks (reaching, grooming, dressing, walking)                     With   [] TE   [] TA   [] neuro   [] other: Patient Education: [x] Review HEP    [] Progressed/Changed HEP based on:    [] positioning   [] body mechanics   [] transfers   [] cool compress application    [] other:                                                                                                         Pain Level (0-10 scale) post treatment: 1    ASSESSMENT/Changes in Function:  Pt responding well to all interventions with decreased \"stiffness\" and pain in B shoulders and neck both following therapy session and after performance of HEP          Patient will continue to benefit from skilled PT services to effectively manage lymphedema and L UE edema, modify and progress therapeutic interventions, address functional mobility deficits, address ROM deficits, address strength deficits, analyze and cue movement patterns, analyze and modify body mechanics/ergonomics, assess and modify postural abnormalities, address imbalance/dizziness and instruct in home and community integration to attain remaining goals.      []  See Plan of Care  []  See progress note/recertification  []  See Discharge Summary           PLAN  [x]  Upgrade activities as tolerated     [x]  Continue plan of care  []  Update interventions per flow sheet       []  Discharge due to:_  []  Other:      THOMPSON Ham, CLT-CRISTOPHER 3/21/2019

## 2019-03-26 ENCOUNTER — HOSPITAL ENCOUNTER (OUTPATIENT)
Dept: PHYSICAL THERAPY | Age: 84
Discharge: HOME | End: 2019-03-26
Payer: MEDICARE

## 2019-03-26 ENCOUNTER — HOSPITAL ENCOUNTER (OUTPATIENT)
Dept: LAB | Age: 84
Discharge: HOME | End: 2019-03-26
Payer: MEDICARE

## 2019-03-26 PROCEDURE — 97110 THERAPEUTIC EXERCISES: CPT | Performed by: PHYSICAL THERAPIST

## 2019-03-26 PROCEDURE — 97140 MANUAL THERAPY 1/> REGIONS: CPT | Performed by: PHYSICAL THERAPIST

## 2019-03-26 PROCEDURE — 85651 RBC SED RATE NONAUTOMATED: CPT

## 2019-03-26 PROCEDURE — 86140 C-REACTIVE PROTEIN: CPT

## 2019-03-26 PROCEDURE — 36415 COLL VENOUS BLD VENIPUNCTURE: CPT

## 2019-03-26 NOTE — PROGRESS NOTES
PT DAILY TREATMENT NOTE - [facility] 2-15    Patient Name: Isauro Castillo  Date:3/26/2019  : 1935  [x]  Patient  Verified  Payor: [place] MEDICARE / Plan: [place] MEDICARE PART A & B / Product Type: Medicare /    In time9:00  Out time:10:00  Total Treatment Time (min): 60  Total Timed Codes (min): 60  1:1 Treatment Time ( only): 60  Visit #: 12    Treatment Area: Left hand pain [M79.642]    SUBJECTIVE  Pain Level (0-10 scale): 5/10 B shoulders, hands, and legs  Any medication changes, allergies to medications, adverse drug reactions, diagnosis change, or new procedure performed?: [x] No    [] Yes (see summary sheet for update)  Subjective functional status/changes:   [] No changes reported  \"I am not having a good day, I ache all over, could barely get out of bed this morning\" Pt is going for more blood work this afternoon and has titrated down to 15 mg of prednisone, supposed to start at 12.5mg tomorrow. Pt very concerned with her increased symptoms.      OBJECTIVE         30 min Therapeutic Exercise:  [x] See flow sheet :   Rationale: increase ROM and reduce pain to improve the patients ability to sit to stand with less pain present       30 min Manual Therapy:  STM B UT/LS, subscap/TM, PROM B g-h jts with elbow flexion   Rationale: decrease pain, increase ROM, increase tissue extensibility, decrease trigger points and increase postural awareness to effectively use extremity during functional tasks (reaching, grooming, dressing, walking)                     With   [x] TE   [] TA   [] neuro   [] other: Patient Education: [x] Review HEP    [x] Progressed/Changed HEP based on: subjective assessment   [] positioning   [] body mechanics   [] transfers   [] cool compress application    [] other:                                                                                                         Pain Level (0-10 scale) post treatment: 3-4    ASSESSMENT/Changes in Function:  Pt only able to tolerate gentle stretching and range of motion exercises today as strengthening exercises increased pain. B pec minor m very guarded during STM. Patient will continue to benefit from skilled PT services to effectively manage lymphedema and L UE edema, modify and progress therapeutic interventions, address functional mobility deficits, address ROM deficits, address strength deficits, analyze and cue movement patterns, analyze and modify body mechanics/ergonomics, assess and modify postural abnormalities, address imbalance/dizziness and instruct in home and community integration to attain remaining goals.      []  See Plan of Care  []  See progress note/recertification  []  See Discharge Summary           PLAN  [x]  Upgrade activities as tolerated     [x]  Continue plan of care  []  Update interventions per flow sheet       []  Discharge due to:_  []  Other:      THOMPSON Carpenter, OLIVER-CRISTOPHER 3/26/2019 No

## 2019-03-27 LAB
CRP SERPL-MCNC: 12.7 MG/L (ref 0–4.9)
ERYTHROCYTE [SEDIMENTATION RATE] IN BLOOD BY WESTERGREN METHOD: 22 MM/HR (ref 0–40)

## 2019-03-28 ENCOUNTER — HOSPITAL ENCOUNTER (OUTPATIENT)
Dept: PHYSICAL THERAPY | Age: 84
Discharge: HOME | End: 2019-03-28
Payer: MEDICARE

## 2019-03-28 PROCEDURE — 97110 THERAPEUTIC EXERCISES: CPT | Performed by: PHYSICAL THERAPIST

## 2019-04-01 ENCOUNTER — HOSPITAL ENCOUNTER (OUTPATIENT)
Dept: PHYSICAL THERAPY | Age: 84
Discharge: HOME | End: 2019-04-01
Payer: MEDICARE

## 2019-04-01 PROCEDURE — 97110 THERAPEUTIC EXERCISES: CPT

## 2019-04-01 NOTE — PROGRESS NOTES
PT DAILY TREATMENT NOTE - [facility] 2-15    Patient Name: Joy Santos  Date:2019  : 1935  [x]  Patient  Verified  Payor: Sanaz Vicente / Plan: [place] MEDICARE PART A & B / Product Type: Medicare /    In time 2:30p Out time:3:35p  Total Treatment Time (min): 65  Total Timed Codes (min): 55  1:1 Treatment Time ( [address] only): 55  Visit #: 14    Treatment Area: Left hand pain [M79.642]    SUBJECTIVE  Pain Level (0-10 scale): 4/10 B shoulders, and legs  Any medication changes, allergies to medications, adverse drug reactions, diagnosis change, or new procedure performed?: [x] No    [] Yes (see summary sheet for update)  Subjective functional status/changes:   [] No changes reported  Pt reports she has some cramps in her ankles over the top of her foot R>L that has been waking her at night.       OBJECTIVE    Modality rationale: decrease inflammation and decrease pain to improve the patients ability to perform ADL skills   Type Additional Details    [] Estim: []Att   [x]Unatt    []TENS instruct                  []IFC  []Premod   []NMES                     []Other:  []w/US   []w/ice   []w/heat  Position:seated  Location: bilateral brachial area    []  Traction: [] Cervical       []Lumbar                       [] Prone          []Supine                       []Intermittent   []Continuous Lbs:  [] before manual  [] after manual  []w/heat    []  Ultrasound: []Continuous   [] Pulsed                       at: []1MHz   []3MHz Location:  W/cm2:    [] Paraffin         Location:   []w/heat    []  Ice     [x]  Heat:10  []  Ice massage Position:reclined  Location:B shoulders, hips    []  Laser  []  Other: Position:  Location:    []  Vasopneumatic Device Pressure:       [] lo [] med [] hi   Temperature:        [x] Skin assessment post-treatment:  [x]intact [x]redness- no adverse reaction    []redness - adverse reaction:            55 min Therapeutic Exercise:  [x] See flow sheet :   Rationale: increase ROM and reduce pain to improve the patients ability to sit to stand with less pain present                       With   [] TE   [] TA   [] neuro   [] other: Patient Education: [x] Review HEP    [] Progressed/Changed HEP based on:     [] positioning   [] body mechanics   [] transfers   [] cool compress application    [] other:                                                                                                         Pain Level (0-10 scale) post treatment: 2 shoulders 2 hips    ASSESSMENT/Changes in Function:  Patient demonstrates good tolerance for interventions with focus on increasing tissue extensibility and AROM. Patient requires mod verbal cues for form and postural awareness throughout. Patient will continue to benefit from skilled PT services to effectively manage lymphedema and L UE edema, modify and progress therapeutic interventions, address functional mobility deficits, address ROM deficits, address strength deficits, analyze and cue movement patterns, analyze and modify body mechanics/ergonomics, assess and modify postural abnormalities, address imbalance/dizziness and instruct in home and community integration to attain remaining goals.      []  See Plan of Care  []  See progress note/recertification  []  See Discharge Summary           PLAN  [x]  Upgrade activities as tolerated     [x]  Continue plan of care  []  Update interventions per flow sheet       []  Discharge due to:_  []  Other:      Karime Celeste, ELIOT 4/1/2019

## 2019-04-03 ENCOUNTER — TELEPHONE (OUTPATIENT)
Dept: RHEUMATOLOGY | Age: 84
End: 2019-04-03

## 2019-04-03 RX ORDER — PREDNISONE 5 MG/1
TABLET ORAL
Qty: 90 TAB | Refills: 1 | OUTPATIENT
Start: 2019-04-03 | End: 2021-03-01

## 2019-04-03 NOTE — TELEPHONE ENCOUNTER
Pt states she taper her prednisone down to 12.5 mg from 15 mg a week ago and the pain in her shoulders, legs and back has increased.  States she is out of prednisone and think she may need to increase the dosage

## 2019-04-03 NOTE — TELEPHONE ENCOUNTER
Pt's  notified to inform patient prednisone rx was sent to pharmacy.  informed to tell patient increase prednisone to 15 mg for 2 weeks then go back to the taper.  informed to have patient call office if she doesn't understand. Pt  verbalized understanding.

## 2019-04-03 NOTE — TELEPHONE ENCOUNTER
----- Message from Bernadette Linares RN sent at 4/3/2019  9:16 AM EDT -----  Regarding: FW: Dr Pedro Luis Oconnell      ----- Message -----  From: Lino Rojas  Sent: 4/3/2019   8:59 AM  To: [facility]  Subject: Dr Pedro Luis Oconnell                             Pt is still waiting on a call back, from yesterday, regarding her pain that is getting worst, and her refill on Prednisone call into [facility], number on file, please call pt at [phone] or [phone]

## 2019-04-04 ENCOUNTER — HOSPITAL ENCOUNTER (OUTPATIENT)
Dept: PHYSICAL THERAPY | Age: 84
Discharge: HOME | End: 2019-04-04
Payer: MEDICARE

## 2019-04-04 PROCEDURE — 97110 THERAPEUTIC EXERCISES: CPT | Performed by: PHYSICAL THERAPIST

## 2019-04-04 NOTE — PROGRESS NOTES
PT DAILY TREATMENT NOTE - [facility] 2-15    Patient Name: Fredrick Dewitt  Date:2019  : 1935  [x]  Patient  Verified  Payor: Isabel Fuentes / Plan: [place] / Product Type: Medicare /    In time 12:00 Out time:1:00  Total Treatment Time (min): 60  Total Timed Codes (min): 60  1:1 Treatment Time ([facility] only): 60  Visit #: 15    Treatment Area: Left hand pain [M79.642]    SUBJECTIVE  Pain Level (0-10 scale): 4 \"all over discomfort\"  Any medication changes, allergies to medications, adverse drug reactions, diagnosis change, or new procedure performed?: [] No    [x] Yes (see summary sheet for update)  Subjective functional status/changes:   [] No changes reported  Pt reports \"I am feeling off and shaky today especially in my L hand\" Pt states that she is very concerned that this is related to the increase in prednisone from 12.5 back to 15mg per day that she took this AM.      OBJECTIVE        60 min Therapeutic Exercise:  [x] See flow sheet :   Rationale: increase ROM and reduce pain to improve the patients ability to sit to stand with less pain present                       With   [] TE   [] TA   [] neuro   [] other: Patient Education: [x] Review HEP    [] Progressed/Changed HEP based on:     [] positioning   [] body mechanics   [] transfers   [] cool compress application    [] other:                                                                                                         Other Objective Measures: oral temp: 99.3  BP seated R arm: 122/62 SpO2=93-97% at rest HR= at rest  Pain Level (0-10 scale) post treatment: 4     ASSESSMENT/Changes in Function:  Pt did not tolerate strengthening exercises today and no progression of therapeutic exercises d/t c/o's of \"feeling too warm and shaky\" Her symptoms did not increase during tx session and all vital signs remained in the same range as above. Elevated HR could be d/t stress of not knowing where shakiness is coming from.  Strongly recommended to contact her PCP concerning new symptom and to go to ER if symptoms worsened. Patient will continue to benefit from skilled PT services to effectively manage lymphedema and L UE edema, modify and progress therapeutic interventions, address functional mobility deficits, address ROM deficits, address strength deficits, analyze and cue movement patterns, analyze and modify body mechanics/ergonomics, assess and modify postural abnormalities, address imbalance/dizziness and instruct in home and community integration to attain remaining goals.      []  See Plan of Care  []  See progress note/recertification  []  See Discharge Summary           PLAN  [x]  Upgrade activities as tolerated     [x]  Continue plan of care  []  Update interventions per flow sheet       []  Discharge due to:_  []  Other:      THOMPSON Cardona, OLIVER-CRISTOPHER 4/4/2019

## 2019-04-08 ENCOUNTER — HOSPITAL ENCOUNTER (OUTPATIENT)
Dept: LAB | Age: 84
Discharge: HOME | End: 2019-04-08
Payer: MEDICARE

## 2019-04-08 ENCOUNTER — HOSPITAL ENCOUNTER (OUTPATIENT)
Dept: PHYSICAL THERAPY | Age: 84
Discharge: HOME | End: 2019-04-08
Payer: MEDICARE

## 2019-04-08 PROCEDURE — 86140 C-REACTIVE PROTEIN: CPT

## 2019-04-08 PROCEDURE — 85651 RBC SED RATE NONAUTOMATED: CPT

## 2019-04-08 PROCEDURE — 97110 THERAPEUTIC EXERCISES: CPT

## 2019-04-08 PROCEDURE — 36415 COLL VENOUS BLD VENIPUNCTURE: CPT

## 2019-04-08 NOTE — PROGRESS NOTES
PT DAILY TREATMENT NOTE - [facility] 2-15    Patient Name: Efe Walters  Date:2019  : 1935  [x]  Patient  Verified  Payor: Keiko Ignacio / Plan: [place] MED[place] PART A & B / Product Type: Medicare /    In time 2:30 Out time:3:30p  Total Treatment Time (min): 60  Total Timed Codes (min): 60  1:1 Treatment Time ( [address] only): 60  Visit #: 16    Treatment Area: Left hand pain [M79.642]    SUBJECTIVE  Pain Level (0-10 scale): 2 \"all over discomfort\"  Any medication changes, allergies to medications, adverse drug reactions, diagnosis change, or new procedure performed?: [] No    [x] Yes (see summary sheet for update)  Subjective functional status/changes:   [] No changes reported  Pt states she is feeling much better than last session. Patient states she called her MD after last session and the nurse called her back who told her to keep taking the increased dose of prednisone. Patient states increased concern with taking prednisone due to co-morbidities and possibility of it \"lowering immune system. \"      OBJECTIVE        60 min Therapeutic Exercise:  [x] See flow sheet :   Rationale: increase ROM and reduce pain to improve the patients ability to sit to stand with less pain present                    With   [] TE   [] TA   [] neuro   [] other: Patient Education: [x] Review HEP    [] Progressed/Changed HEP based on:     [] positioning   [] body mechanics   [] transfers   [] cool compress application    [] other:                                                                                                         Other Objective Measures:     ASSESSMENT/Changes in Function: Patient able to advance several exercises with no increased pain or increased symptoms throughout. Patient required mod verbal and visual cuing for proper exercise reproduction. Patient encouraged to contact MD about concerns about prednisone use and possible effects.      Patient will continue to benefit from skilled PT services to effectively manage lymphedema and L UE edema, modify and progress therapeutic interventions, address functional mobility deficits, address ROM deficits, address strength deficits, analyze and cue movement patterns, analyze and modify body mechanics/ergonomics, assess and modify postural abnormalities, address imbalance/dizziness and instruct in home and community integration to attain remaining goals.      []  See Plan of Care  []  See progress note/recertification  []  See Discharge Summary           PLAN  [x]  Upgrade activities as tolerated     [x]  Continue plan of care  []  Update interventions per flow sheet       []  Discharge due to:_  []  Other:      Leon Hilliard, PTA  4/8/2019

## 2019-04-09 LAB
CRP SERPL-MCNC: 7.3 MG/L (ref 0–4.9)
ERYTHROCYTE [SEDIMENTATION RATE] IN BLOOD BY WESTERGREN METHOD: 11 MM/HR (ref 0–40)

## 2019-04-11 ENCOUNTER — HOSPITAL ENCOUNTER (OUTPATIENT)
Dept: PHYSICAL THERAPY | Age: 84
Discharge: HOME | End: 2019-04-11
Payer: MEDICARE

## 2019-04-11 PROCEDURE — 97110 THERAPEUTIC EXERCISES: CPT | Performed by: PHYSICAL THERAPIST

## 2019-04-11 NOTE — PROGRESS NOTES
PT DAILY TREATMENT NOTE - [facility] 2-15    Patient Name: Glory Martinez  Date:2019  : 1935  [x]  Patient  Verified  Payor: Van [place]avaleta / Plan: [place] MEDICARE PART A & B / Product Type: Medicare /    In time 10:00 Out time: 11:00  Total Treatment Time (min): 60  Total Timed Codes (min): 60  1:1 Treatment Time ( [address] only): 60  Visit #: 17    Treatment Area: Left hand pain [M79.642]    SUBJECTIVE  Pain Level (0-10 scale): 2 R shoulder  Any medication changes, allergies to medications, adverse drug reactions, diagnosis change, or new procedure performed?: [] No    [x] Yes (see summary sheet for update)    Subjective functional status/changes:   [] No changes reported  Pt continues to feel better with less pain although she is worried about continuing on prednisone and its affects on her Cystic Fibrosis. Pt also reports that she is sleeping better and has more energy during the day. OBJECTIVE        60 min Therapeutic Exercise:  [x] See flow sheet :   Rationale: increase ROM and reduce pain to improve the patients ability to sit to stand with less pain present                    With   [] TE   [] TA   [] neuro   [] other: Patient Education: [x] Review HEP    [] Progressed/Changed HEP based on:     [] positioning   [] body mechanics   [] transfers   [] cool compress application    [] other:                                                                                                         Other Objective Measures:  Other Objective/Functional Measures:   Girth (cm)                                            Distance from wrist (cm)                          R                      L  Palm:               17.4                 18.3  Wrist:               15                    15  Forearm:         21.6                 21.5                             14              Elbow:             23                    23  Bicep:              23.9                 24                             32.5  Axilla:           Girth (cm)                                           Distance from posterior calcaneus (cm)                          R                      L  MTP:                20.4                 20.5                   Ankle:              20                  20  Calf:                 34                  33                32.5  Knee:               37.1                 36.9  Thigh:              45                  44                                                        LOWER QUARTER                            MUSCLE STRENGTH  KEY                                                                             R                      L  0 - No Contraction                   L1, L2 Psoas               4                     4  1 - Trace                                  L3 Quads                    4                     4  2 - Poor                                   L4 Tib Ant                    4                      4  3 - Fair                                     L5 EHL                        4                      4  4 - Good                                  S1 FHL                        4                      4  5 - Normal                               S2 Hams                     4                     4                         UPPER QUARTER                             MUSCLE STRENGTH  KEY                                                                             R                      L  0 - No Contraction                   C1, C2 Neck Flex                                  1 - Trace                                  C3 Side Flex                                         2 - Poor                                   C4 Sh Elev                                            3 - Fair                                     C5 Deltoid/Biceps       4                     4  4 - Good                                  C6 Wrist Ext                4                     4-  5 - Normal                               C7 Triceps                   4 4                                                  C8 Thumb Ext             4                     4                                                  T1 Hand Inst                4                     4-                                                                       MMT:  on R 20 lb, L 18.2 lb       ASSESSMENT/Changes in Function:  Pt has completed all rehab goals and is Independent with HEP. See D/C summary        Patient will continue to benefit from skilled PT services to effectively manage lymphedema and L UE edema, modify and progress therapeutic interventions, address functional mobility deficits, address ROM deficits, address strength deficits, analyze and cue movement patterns, analyze and modify body mechanics/ergonomics, assess and modify postural abnormalities, address imbalance/dizziness and instruct in home and community integration to attain remaining goals. []  See Plan of Care  []  See progress note/recertification  [x]  See Discharge Summary           PLAN  []  Upgrade activities as tolerated     []  Continue plan of care  []  Update interventions per flow sheet       [x]  Discharge due to:  All goals met  []  Other:      THOMPSON Sorto, OLIVER-CRISTOPHER  4/11/2019

## 2019-04-11 NOTE — ANCILLARY DISCHARGE INSTRUCTIONS
[facility] [address] [place] [address], [address]  Phone: [phone] Fax: [phone]      Discharge Summary 2-15    Patient name: Danika Neal  : 1935  Provider#: [id number]  Referral source: Erica Gaspar MD      Medical/Treatment Diagnosis: Left hand pain [M79.642]     Prior Hospitalization: see medical history     Comorbidities: See Plan of Care  Prior Level of Function: See Plan of Care  Medications: Verified on Patient Summary List    Start of Care: 2019      Onset Date:2019   Visits from Start of Care: 17     Missed Visits: 0  Reporting Period : 2019 to 2019    Assessment/Summary of care: Pt has completed all rehab goals below and is Independent with HEP. GOAL STATUS:  1) Pt will be Independent with skin care routine to decrease risk of infection. MET  2) Pt will be Independent in don/doff/wearing schedule of light compression. MET  3) Pt will know which signs and symptoms to report immediately to physician concerning their condition. MET  4) Pt will be Independent with compression management routine consisting of skin care, decongestive exercises, self manual lymphatic stimulation techniques, and don/doff of appropriate compression garment (s). MET  5) Pt will be Independent with HEP for edema management, utilizing correct breath pattern, strengthening, and motion exercises. MET  6) Pt will utilize correct breath and movement patterns during all ADL's involving L UE and WB B LE's with decreased pain by 2-3 levels on NPS. MET  7) Pt will demo sit to stand transfer with no UE assistance and <2/10 pn MET  8) Pt will demo premorbid mobility and ADL abilties at the time of D/CMET  9)Pt will demo the ability to perform cervical and shoulder AROM to allow for safe driving, dressing ,and household chores independently MET           RECOMMENDATIONS:  [x]Discontinue therapy: [x]Patient has reached or is progressing toward set goals     []Patient is non-compliant or has abdicated     []Due to lack of appreciable progress towards set goals     []Other  [address] [place] [address], [facility] 4/11/2019

## 2019-04-22 RX ORDER — HYDROCHLOROTHIAZIDE 25 MG/1
TABLET ORAL
Qty: 90 TAB | Refills: 1 | Status: SHIPPED | OUTPATIENT
Start: 2019-04-22 | End: 2019-09-09 | Stop reason: SDUPTHER

## 2019-04-23 ENCOUNTER — HOSPITAL ENCOUNTER (OUTPATIENT)
Dept: LAB | Age: 84
Discharge: HOME | End: 2019-04-23
Payer: MEDICARE

## 2019-04-23 PROCEDURE — 36415 COLL VENOUS BLD VENIPUNCTURE: CPT

## 2019-04-23 PROCEDURE — 86140 C-REACTIVE PROTEIN: CPT

## 2019-04-23 PROCEDURE — 85651 RBC SED RATE NONAUTOMATED: CPT

## 2019-04-24 LAB
CRP SERPL-MCNC: 11.4 MG/L (ref 0–4.9)
ERYTHROCYTE [SEDIMENTATION RATE] IN BLOOD BY WESTERGREN METHOD: 6 MM/HR (ref 0–40)

## 2020-01-31 ENCOUNTER — OFFICE VISIT (OUTPATIENT)
Dept: FAMILY MEDICINE CLINIC | Age: 85
End: 2020-01-31

## 2020-01-31 ENCOUNTER — TELEPHONE (OUTPATIENT)
Dept: FAMILY MEDICINE CLINIC | Age: 85
End: 2020-01-31

## 2020-01-31 ENCOUNTER — HOSPITAL ENCOUNTER (OUTPATIENT)
Dept: GENERAL RADIOLOGY | Age: 85
Discharge: HOME | End: 2020-01-31
Attending: FAMILY MEDICINE
Payer: MEDICARE

## 2020-01-31 VITALS
SYSTOLIC BLOOD PRESSURE: 129 MMHG | OXYGEN SATURATION: 96 % | HEART RATE: 91 BPM | TEMPERATURE: 98.4 F | DIASTOLIC BLOOD PRESSURE: 73 MMHG | WEIGHT: 132.2 LBS | RESPIRATION RATE: 16 BRPM | HEIGHT: 61 IN | BODY MASS INDEX: 24.96 KG/M2

## 2020-01-31 DIAGNOSIS — M54.50 ACUTE RIGHT-SIDED LOW BACK PAIN WITHOUT SCIATICA: ICD-10-CM

## 2020-01-31 DIAGNOSIS — M25.561 ACUTE PAIN OF RIGHT KNEE: Primary | ICD-10-CM

## 2020-01-31 DIAGNOSIS — M25.551 PAIN OF RIGHT HIP JOINT: ICD-10-CM

## 2020-01-31 DIAGNOSIS — T14.90XA INJURY: ICD-10-CM
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
## 2020-01-31 PROCEDURE — 73502 X-RAY EXAM HIP UNI 2-3 VIEWS: CPT

## 2020-01-31 PROCEDURE — 72110 X-RAY EXAM L-2 SPINE 4/>VWS: CPT

## 2020-01-31 PROCEDURE — 73562 X-RAY EXAM OF KNEE 3: CPT

## 2020-01-31 RX ORDER — LANOLIN ALCOHOL/MO/W.PET/CERES
1000 CREAM (GRAM) TOPICAL
COMMUNITY

## 2020-01-31 RX ORDER — PREDNISONE 1 MG/1
2 TABLET ORAL
COMMUNITY
Start: 2020-01-16 | End: 2021-03-01

## 2020-01-31 RX ORDER — GLUCOSAMINE SULFATE 1500 MG
POWDER IN PACKET (EA) ORAL DAILY
COMMUNITY

## 2020-01-31 NOTE — TELEPHONE ENCOUNTER
Called and left message. No acute fractures that need to be addressed today (has possible worsening of lower thoracic compression fracture).   See me as scheduled and if continued or worse pain, may need CT or MRI    [facility]

## 2020-01-31 NOTE — PROGRESS NOTES
Assessment and Plan    1. Acute pain of right knee  Pain right medical tibia  - XR KNEE RT MAX 2 VWS; Future    2. Pain of right hip joint  Could have a fracture of either pelvis or hip, check both  - XR HIP RT W OR WO PELV 2-3 VWS; Future    3. Acute right-sided low back pain without sciatica  Ongoing back pain, check for compression fracture. Neuro motor intact  - XR SPINE LUMB MIN 4 V; Future    4. Fall, initial encounter  Ground level, no LOC, no head trauma  Chronic steroids in 82year old, possible osteoporosis. Aggressive checks for fractures if sx continue. Follow-up and Dispositions    · Return in about 4 days (around 2/4/2020) for Review test results. Diagnosis and plan discussed with patient who verbillized understanding. History of present Leslie Westfall is a 82 y.o. female presenting for Fall (1/24/2020 hurt right hip and leg )    Paige Serrano a week ago  Stepped over gait and fell forward onto right hip  Points to posterior to right trochanter as where she hurt   Bruise right inner lower leg  Hit right shoulder too. Seems to be hurting worse  Walking on it with some soreness  Some trouble walking and getting from chairs etc.  Danielle Carmonaethan last year, now off of Prednisone since October. Recurred after URI back on 20 mg of Prednisone and now tapering. Now on 3 mg per day. Hip pain and low back pain are biggest problems  Has not had any care yet. No previous cane use. Review of Systems   Respiratory: Negative. Cardiovascular: Negative. Musculoskeletal: Positive for back pain, falls, joint pain and myalgias. Endo/Heme/Allergies: Bruises/bleeds easily. Psychiatric/Behavioral: Negative.           Past Medical History:   Diagnosis Date    Chronic insomnia     Cystic fibrosis ([facility] [address]     Dr. Anatoliy De Paz, [facility], Brochiectasis, pancreatic cysts in past    GERD (gastroesophageal reflux disease)     HTN (hypertension)     Osteoporosis      Past Surgical History:   Procedure Laterality Date    HX DILATION AND CURETTAGE      HX HYSTERECTOMY      HX TONSIL AND ADENOIDECTOMY       Family History   Problem Relation Age of Onset    Coronary Artery Disease Mother     Breast Cancer Sister     Cystic Fibrosis Son      Social History     Socioeconomic History    Marital status:      Spouse name: Not on file    Number of children: Not on file    Years of education: Not on file    Highest education level: Not on file   Occupational History    Not on file   Social Needs    Financial resource strain: Not on file    Food insecurity:     Worry: Not on file     Inability: Not on file    Transportation needs:     Medical: Not on file     Non-medical: Not on file   Tobacco Use    Smoking status: Never Smoker    Smokeless tobacco: Never Used   Substance and Sexual Activity    Alcohol use: No     Frequency: Never    Drug use: No    Sexual activity: Not Currently   Lifestyle    Physical activity:     Days per week: Not on file     Minutes per session: Not on file    Stress: Not on file   Relationships    Social connections:     Talks on phone: Not on file     Gets together: Not on file     Attends Anabaptist service: Not on file     Active member of club or organization: Not on file     Attends meetings of clubs or organizations: Not on file     Relationship status: Not on file    Intimate partner violence:     Fear of current or ex partner: Not on file     Emotionally abused: Not on file     Physically abused: Not on file     Forced sexual activity: Not on file   Other Topics Concern    Not on file   Social History Narrative    Not on file         Prior to Admission medications    Medication Sig Start Date End Date Taking? Authorizing Provider   dornase alpha (PULMOZYME) 1 mg/mL nebulizer solution Take 2.5 mg by inhalation. Yes Provider, Historical   cyanocobalamin 1,000 mcg tablet Take 1,000 mcg by mouth.    Yes Provider, Historical   predniSONE (DELTASONE) 1 mg tablet 3 mg. 1/16/20  Yes Provider, Historical   cholecalciferol (VITAMIN D3) 25 mcg (1,000 unit) cap Take  by mouth daily. Yes Provider, Historical   hydroCHLOROthiazide (HYDRODIURIL) 25 mg tablet TAKE 1 TABLET BY MOUTH EVERY DAY 9/9/19  Yes Vy Ruvalcaba MD   acetaminophen (TYLENOL PO) Take  by mouth. Yes Provider, Historical   diphenhydramine HCl (SLEEP AID, DIPHENHYDRAMINE, PO) Take  by mouth. Yes Provider, Historical   vit A/C/E ac/ZnOx/cupric oxide (EYE VITAMIN AND MINERALS PO) Take  by mouth. Yes Provider, Historical   predniSONE (DELTASONE) 5 mg tablet PLEASE SEE ATTACHED FOR DETAILED DIRECTIONS 4/3/19   Brianna Jackson MD   dornase alpha (PULMOZYME) 1 mg/mL nebulizer solution Take 2.5 mg by inhalation daily. 1/31/20  Provider, Historical        Allergies   Allergen Reactions    Levothyroxine Other (comments)     Hypertension, rash  Other reaction(s): Other (see comments)  Hypertension, rash    Minocycline Unable to Obtain    Shellfish Derived Rash    Tetanus Toxoid, Adsorbed Other (comments)    Unable To Obtain Unable to Obtain       Vitals:    01/31/20 1134 01/31/20 1147   BP: 155/79 129/73   Pulse: 91    Resp: 16    Temp: 98.4 °F (36.9 °C)    TempSrc: Oral    SpO2: 96%    Weight: 132 lb 3.2 oz (60 kg)    Height: 5' 1\" (1.549 m)      Body mass index is 24.98 kg/m². Objective  Physical Exam  General: Patient alert and oriented and in NAD  BACK EXAM:   Inspection: normal skin, soft tissue and bony appearance with normal cervical and lumbar lordotic curve, no gross edema or evidence of acute injury;   Palpation: tender just posterior to right greater trochanter and right medial tibial plateau.   Neurovascular: deep tendon reflexes: 2/4 left patellar,  2/4 right patellar,  2/4 left Achilles,  2/4 right Achilles, Symmetric Reflexes without clonus;   Muscular Strength: 5/5 bilateral quadriceps;  5/5 bilateral tibialis anterior;  5/5 bilateral extensor hallucis longus;  5/5 bilateral extensor digitorum longus and brevis;  5/5 bilateral peroneus longus and brevis;  5/5 bilateral gastrocnemius-soleus;   Range of Motion: full active ROM with extension, flexion, left lateral bending, right lateral bending, left rotation, and right rotation;   Pain with internal rotation and flexion of right hip  Maneuvers:   (-) bilateral straight leg raise     Full Range of motion of both hips with pain on flexion and internal rotation right. Bruised tender right medial tibia. Psych: Appropriate mood and affect, no homicidal or suicidal ideation, no obsessions, delusions or hallucinations, normal psychomotor status.

## 2020-01-31 NOTE — PROGRESS NOTES
Identified pt with two pt identifiers(name and ). Reviewed record in preparation for visit and have obtained necessary documentation. Chief Complaint   Patient presents with    Fall     2020 hurt right hip and leg         Health Maintenance Due   Topic    DTaP/Tdap/Td series (1 - Tdap)    Shingrix Vaccine Age 50> (1 of 2)    GLAUCOMA SCREENING Q2Y     Bone Densitometry (Dexa) Screening     MEDICARE YEARLY EXAM     Influenza Age 5 to Adult        Coordination of Care Questionnaire:  :   1) Have you been to an emergency room, urgent care, or hospitalized since your last visit? If yes, where when, and reason for visit? No       2. Have seen or consulted any other health care provider since your last visit? If yes, where when, and reason for visit?   YES  [facility] CLINIC

## 2020-02-04 ENCOUNTER — OFFICE VISIT (OUTPATIENT)
Dept: FAMILY MEDICINE CLINIC | Age: 85
End: 2020-02-04

## 2020-02-04 VITALS
RESPIRATION RATE: 16 BRPM | TEMPERATURE: 98.6 F | OXYGEN SATURATION: 98 % | SYSTOLIC BLOOD PRESSURE: 139 MMHG | WEIGHT: 130 LBS | HEIGHT: 61 IN | HEART RATE: 91 BPM | BODY MASS INDEX: 24.55 KG/M2 | DIASTOLIC BLOOD PRESSURE: 79 MMHG

## 2020-02-04 DIAGNOSIS — M25.551 PAIN OF RIGHT HIP JOINT: Primary | ICD-10-CM

## 2020-02-04 NOTE — PROGRESS NOTES
Assessment and Plan    1. Pain of right hip joint  Lots of pain still and some difficulty bearing weight. Now 12 days from fall. Recommend ortho eval by Dr. Rizwan Everett to see if additional imaging is worthwhile looking for pelvic or acetabular fractures. - REFERRAL TO ORTHOPEDICS      Follow-up and Dispositions    · Return in about 1 month (around 3/4/2020). Diagnosis and plan discussed with patient who verbillized understanding. History of present Leslie Westfall is a 82 y.o. female presenting for Fall (This is a follow- visit )    Still a lot of pain right hip when lifts it  May be somewhat better. Xrays were negative. Review of Systems   Musculoskeletal: Positive for joint pain.          Past Medical History:   Diagnosis Date    Chronic insomnia     Cystic fibrosis ([facility] [address]     Dr. Anatoliy De Paz, [facility], Brochiectasis, pancreatic cysts in past    GERD (gastroesophageal reflux disease)     HTN (hypertension)     Osteoporosis      Past Surgical History:   Procedure Laterality Date    HX DILATION AND CURETTAGE      HX HYSTERECTOMY      HX TONSIL AND ADENOIDECTOMY       Family History   Problem Relation Age of Onset    Coronary Artery Disease Mother     Breast Cancer Sister     Cystic Fibrosis Son      Social History     Socioeconomic History    Marital status:      Spouse name: Not on file    Number of children: Not on file    Years of education: Not on file    Highest education level: Not on file   Occupational History    Not on file   Social Needs    Financial resource strain: Not on file    Food insecurity:     Worry: Not on file     Inability: Not on file    Transportation needs:     Medical: Not on file     Non-medical: Not on file   Tobacco Use    Smoking status: Never Smoker    Smokeless tobacco: Never Used   Substance and Sexual Activity    Alcohol use: No     Frequency: Never    Drug use: No    Sexual activity: Not Currently   Lifestyle    Physical activity:     Days per week: Not on file     Minutes per session: Not on file    Stress: Not on file   Relationships    Social connections:     Talks on phone: Not on file     Gets together: Not on file     Attends Jain service: Not on file     Active member of club or organization: Not on file     Attends meetings of clubs or organizations: Not on file     Relationship status: Not on file    Intimate partner violence:     Fear of current or ex partner: Not on file     Emotionally abused: Not on file     Physically abused: Not on file     Forced sexual activity: Not on file   Other Topics Concern    Not on file   Social History Narrative    Not on file         Prior to Admission medications    Medication Sig Start Date End Date Taking? Authorizing Provider   dornase alpha (PULMOZYME) 1 mg/mL nebulizer solution Take 2.5 mg by inhalation. Yes Provider, Historical   cyanocobalamin 1,000 mcg tablet Take 1,000 mcg by mouth. Yes Provider, Historical   predniSONE (DELTASONE) 1 mg tablet 2 mg. 1/16/20  Yes Provider, Historical   cholecalciferol (VITAMIN D3) 25 mcg (1,000 unit) cap Take  by mouth daily. Yes Provider, Historical   hydroCHLOROthiazide (HYDRODIURIL) 25 mg tablet TAKE 1 TABLET BY MOUTH EVERY DAY 9/9/19  Yes Clay Irby MD   acetaminophen (TYLENOL PO) Take  by mouth. Yes Provider, Historical   vit A/C/E ac/ZnOx/cupric oxide (EYE VITAMIN AND MINERALS PO) Take  by mouth. Yes Provider, Historical   predniSONE (DELTASONE) 5 mg tablet PLEASE SEE ATTACHED FOR DETAILED DIRECTIONS 4/3/19   Shaista Shaver MD   diphenhydramine HCl (SLEEP AID, DIPHENHYDRAMINE, PO) Take  by mouth. Provider, Historical        Allergies   Allergen Reactions    Levothyroxine Other (comments)     Hypertension, rash  Other reaction(s):  Other (see comments)  Hypertension, rash    Minocycline Unable to Obtain    Shellfish Derived Rash    Tetanus Toxoid, Adsorbed Other (comments)    Unable To Obtain Unable to Obtain       Vitals:    02/04/20 1515   BP: 139/79   Pulse: 91   Resp: 16   Temp: 98.6 °F (37 °C)   TempSrc: Oral   SpO2: 98%   Weight: 130 lb (59 kg)   Height: 5' 1\" (1.549 m)     Body mass index is 24.56 kg/m². Objective  Physical Exam  Vitals signs and nursing note reviewed. Constitutional:       Appearance: Normal appearance. Musculoskeletal:      Right hip: She exhibits decreased range of motion and bony tenderness (trochanter). Legs:    Skin:         Neurological:      Mental Status: She is alert.

## 2020-02-04 NOTE — PROGRESS NOTES
Identified pt with two pt identifiers(name and ). Reviewed record in preparation for visit and have obtained necessary documentation. Chief Complaint   Patient presents with    Fall     This is a follow- visit         Health Maintenance Due   Topic    DTaP/Tdap/Td series (1 - Tdap)    Shingrix Vaccine Age 50> (1 of 2)    GLAUCOMA SCREENING Q2Y     Bone Densitometry (Dexa) Screening     MEDICARE YEARLY EXAM     Influenza Age 5 to Adult        Coordination of Care Questionnaire:  :   1) Have you been to an emergency room, urgent care, or hospitalized since your last visit? If yes, where when, and reason for visit? No       2. Have seen or consulted any other health care provider since your last visit? If yes, where when, and reason for visit?   No

## 2020-04-08 RX ORDER — HYDROCHLOROTHIAZIDE 25 MG/1
TABLET ORAL
Qty: 90 TAB | Refills: 1 | Status: SHIPPED | OUTPATIENT
Start: 2020-04-08 | End: 2020-10-06

## 2020-10-06 RX ORDER — HYDROCHLOROTHIAZIDE 25 MG/1
TABLET ORAL
Qty: 90 TAB | Refills: 1 | Status: SHIPPED | OUTPATIENT
Start: 2020-10-06

## 2021-01-27 ENCOUNTER — IMMUNIZATION (OUTPATIENT)
Dept: INTERNAL MEDICINE CLINIC | Age: 86
End: 2021-01-27
Payer: MEDICARE

## 2021-01-27 DIAGNOSIS — Z23 ENCOUNTER FOR IMMUNIZATION: Primary | ICD-10-CM

## 2021-01-27 PROCEDURE — 0011A PR IMM ADMN SARSCOV2 100 MCG/0.5 ML 1ST DOSE: CPT | Performed by: FAMILY MEDICINE

## 2021-01-27 PROCEDURE — 91301 COVID-19, MRNA, LNP-S, PF, 100MCG/0.5ML DOSE(MODERNA): CPT | Performed by: FAMILY MEDICINE

## 2021-02-24 ENCOUNTER — IMMUNIZATION (OUTPATIENT)
Dept: INTERNAL MEDICINE CLINIC | Age: 86
End: 2021-02-24
Payer: MEDICARE

## 2021-02-24 DIAGNOSIS — Z23 ENCOUNTER FOR IMMUNIZATION: Primary | ICD-10-CM

## 2021-02-24 PROCEDURE — 0012A COVID-19, MRNA, LNP-S, PF, 100MCG/0.5ML DOSE(MODERNA): CPT | Performed by: FAMILY MEDICINE

## 2021-02-24 PROCEDURE — 91301 COVID-19, MRNA, LNP-S, PF, 100MCG/0.5ML DOSE(MODERNA): CPT | Performed by: FAMILY MEDICINE

## 2021-03-25 PROBLEM — I10 ESSENTIAL HYPERTENSION: Status: ACTIVE | Noted: 2021-03-25

## 2021-03-25 PROBLEM — I89.0 LYMPHEDEMA: Status: ACTIVE | Noted: 2021-03-25

## 2021-03-25 PROBLEM — M25.50 MULTIPLE JOINT PAIN: Status: ACTIVE | Noted: 2021-03-25

## 2021-03-25 PROBLEM — A31.9 ATYPICAL MYCOBACTERIAL INFECTION: Status: ACTIVE | Noted: 2021-03-25

## 2021-03-25 PROBLEM — E27.8 ADRENAL NODULE (HCC): Status: ACTIVE | Noted: 2021-03-25

## 2021-03-25 PROBLEM — D39.9 NEOPLASM OF UNCERTAIN BEHAVIOR OF FEMALE GENITAL ORGAN: Status: ACTIVE | Noted: 2021-03-25

## 2021-03-25 PROBLEM — R16.0 HEPATOMEGALY: Status: ACTIVE | Noted: 2021-03-25

## 2021-03-25 PROBLEM — E84.19: Status: ACTIVE | Noted: 2021-03-25

## 2022-03-18 PROBLEM — E27.8 ADRENAL NODULE (HCC): Status: ACTIVE | Noted: 2021-03-25

## 2022-03-18 PROBLEM — I89.0 LYMPHEDEMA: Status: ACTIVE | Noted: 2021-03-25

## 2022-03-18 PROBLEM — M35.3 POLYMYALGIA RHEUMATICA (HCC): Status: ACTIVE | Noted: 2019-02-19

## 2022-03-18 PROBLEM — M25.50 MULTIPLE JOINT PAIN: Status: ACTIVE | Noted: 2021-03-25

## 2022-03-19 PROBLEM — D39.9 NEOPLASM OF UNCERTAIN BEHAVIOR OF FEMALE GENITAL ORGAN: Status: ACTIVE | Noted: 2021-03-25

## 2022-03-19 PROBLEM — I10 ESSENTIAL HYPERTENSION: Status: ACTIVE | Noted: 2021-03-25

## 2022-03-19 PROBLEM — A31.9 ATYPICAL MYCOBACTERIAL INFECTION: Status: ACTIVE | Noted: 2021-03-25

## 2022-03-19 PROBLEM — E84.19: Status: ACTIVE | Noted: 2021-03-25

## 2022-03-19 PROBLEM — R16.0 HEPATOMEGALY: Status: ACTIVE | Noted: 2021-03-25

## 2022-03-19 PROBLEM — M19.90 OSTEOARTHRITIS: Status: ACTIVE | Noted: 2019-02-19

## 2023-01-31 RX ORDER — HYDROCHLOROTHIAZIDE 25 MG/1
TABLET ORAL
COMMUNITY
Start: 2020-10-06